# Patient Record
Sex: FEMALE | Race: WHITE | Employment: OTHER | URBAN - METROPOLITAN AREA
[De-identification: names, ages, dates, MRNs, and addresses within clinical notes are randomized per-mention and may not be internally consistent; named-entity substitution may affect disease eponyms.]

---

## 2022-07-30 ENCOUNTER — APPOINTMENT (OUTPATIENT)
Dept: GENERAL RADIOLOGY | Facility: HOSPITAL | Age: 87
End: 2022-07-30
Attending: EMERGENCY MEDICINE
Payer: MEDICARE

## 2022-07-30 ENCOUNTER — APPOINTMENT (OUTPATIENT)
Dept: CT IMAGING | Facility: HOSPITAL | Age: 87
DRG: 071 | End: 2022-07-30
Attending: EMERGENCY MEDICINE
Payer: MEDICARE

## 2022-07-30 ENCOUNTER — APPOINTMENT (OUTPATIENT)
Dept: GENERAL RADIOLOGY | Facility: HOSPITAL | Age: 87
DRG: 071 | End: 2022-07-30
Attending: EMERGENCY MEDICINE
Payer: MEDICARE

## 2022-07-30 ENCOUNTER — HOSPITAL ENCOUNTER (INPATIENT)
Facility: HOSPITAL | Age: 87
LOS: 3 days | Discharge: NURSING, REHAB OR LONG-TERM CARE | End: 2022-08-07
Attending: EMERGENCY MEDICINE | Admitting: HOSPITALIST
Payer: MEDICARE

## 2022-07-30 ENCOUNTER — HOSPITAL ENCOUNTER (INPATIENT)
Facility: HOSPITAL | Age: 87
LOS: 3 days | Discharge: NURSING, REHAB OR LONG-TERM CARE | DRG: 071 | End: 2022-08-07
Attending: EMERGENCY MEDICINE | Admitting: HOSPITALIST
Payer: MEDICARE

## 2022-07-30 ENCOUNTER — APPOINTMENT (OUTPATIENT)
Dept: CT IMAGING | Facility: HOSPITAL | Age: 87
End: 2022-07-30
Attending: EMERGENCY MEDICINE
Payer: MEDICARE

## 2022-07-30 DIAGNOSIS — K62.89 PROCTITIS: ICD-10-CM

## 2022-07-30 DIAGNOSIS — G93.40 ACUTE ENCEPHALOPATHY: Primary | ICD-10-CM

## 2022-07-30 DIAGNOSIS — K62.89 RECTAL PAIN: ICD-10-CM

## 2022-07-30 LAB
ALBUMIN SERPL-MCNC: 3.4 G/DL (ref 3.4–5)
ALBUMIN/GLOB SERPL: 1 {RATIO} (ref 1–2)
ALP LIVER SERPL-CCNC: 84 U/L
ALT SERPL-CCNC: 16 U/L
ANION GAP SERPL CALC-SCNC: 8 MMOL/L (ref 0–18)
AST SERPL-CCNC: 17 U/L (ref 15–37)
BASOPHILS # BLD AUTO: 0.04 X10(3) UL (ref 0–0.2)
BASOPHILS NFR BLD AUTO: 0.4 %
BILIRUB SERPL-MCNC: 0.4 MG/DL (ref 0.1–2)
BUN BLD-MCNC: 16 MG/DL (ref 7–18)
CALCIUM BLD-MCNC: 9.1 MG/DL (ref 8.5–10.1)
CHLORIDE SERPL-SCNC: 106 MMOL/L (ref 98–112)
CO2 SERPL-SCNC: 25 MMOL/L (ref 21–32)
CREAT BLD-MCNC: 1.01 MG/DL
EOSINOPHIL # BLD AUTO: 0.03 X10(3) UL (ref 0–0.7)
EOSINOPHIL NFR BLD AUTO: 0.3 %
ERYTHROCYTE [DISTWIDTH] IN BLOOD BY AUTOMATED COUNT: 13.6 %
GLOBULIN PLAS-MCNC: 3.5 G/DL (ref 2.8–4.4)
GLUCOSE BLD-MCNC: 306 MG/DL (ref 70–99)
HCT VFR BLD AUTO: 39.6 %
HGB BLD-MCNC: 12.6 G/DL
IMM GRANULOCYTES # BLD AUTO: 0.07 X10(3) UL (ref 0–1)
IMM GRANULOCYTES NFR BLD: 0.7 %
LYMPHOCYTES # BLD AUTO: 0.81 X10(3) UL (ref 1–4)
LYMPHOCYTES NFR BLD AUTO: 7.6 %
MCH RBC QN AUTO: 27.4 PG (ref 26–34)
MCHC RBC AUTO-ENTMCNC: 31.8 G/DL (ref 31–37)
MCV RBC AUTO: 86.1 FL
MONOCYTES # BLD AUTO: 0.64 X10(3) UL (ref 0.1–1)
MONOCYTES NFR BLD AUTO: 6 %
NEUTROPHILS # BLD AUTO: 9.08 X10 (3) UL (ref 1.5–7.7)
NEUTROPHILS # BLD AUTO: 9.08 X10(3) UL (ref 1.5–7.7)
NEUTROPHILS NFR BLD AUTO: 85 %
OSMOLALITY SERPL CALC.SUM OF ELEC: 301 MOSM/KG (ref 275–295)
PLATELET # BLD AUTO: 203 10(3)UL (ref 150–450)
POTASSIUM SERPL-SCNC: 4.6 MMOL/L (ref 3.5–5.1)
PROT SERPL-MCNC: 6.9 G/DL (ref 6.4–8.2)
RBC # BLD AUTO: 4.6 X10(6)UL
SARS-COV-2 RNA RESP QL NAA+PROBE: NOT DETECTED
SODIUM SERPL-SCNC: 139 MMOL/L (ref 136–145)
WBC # BLD AUTO: 10.7 X10(3) UL (ref 4–11)

## 2022-07-30 PROCEDURE — 84484 ASSAY OF TROPONIN QUANT: CPT | Performed by: EMERGENCY MEDICINE

## 2022-07-30 PROCEDURE — 36415 COLL VENOUS BLD VENIPUNCTURE: CPT

## 2022-07-30 PROCEDURE — 99285 EMERGENCY DEPT VISIT HI MDM: CPT

## 2022-07-30 PROCEDURE — 83036 HEMOGLOBIN GLYCOSYLATED A1C: CPT | Performed by: HOSPITALIST

## 2022-07-30 PROCEDURE — 93010 ELECTROCARDIOGRAM REPORT: CPT

## 2022-07-30 PROCEDURE — 81001 URINALYSIS AUTO W/SCOPE: CPT | Performed by: EMERGENCY MEDICINE

## 2022-07-30 PROCEDURE — 80053 COMPREHEN METABOLIC PANEL: CPT | Performed by: EMERGENCY MEDICINE

## 2022-07-30 PROCEDURE — 71045 X-RAY EXAM CHEST 1 VIEW: CPT | Performed by: EMERGENCY MEDICINE

## 2022-07-30 PROCEDURE — 85025 COMPLETE CBC W/AUTO DIFF WBC: CPT | Performed by: EMERGENCY MEDICINE

## 2022-07-30 RX ORDER — MAGNESIUM OXIDE 400 MG/1
400 TABLET ORAL DAILY
COMMUNITY

## 2022-07-30 RX ORDER — INSULIN GLARGINE 100 [IU]/ML
20 INJECTION, SOLUTION SUBCUTANEOUS NIGHTLY
COMMUNITY

## 2022-07-30 RX ORDER — ENALAPRIL MALEATE 20 MG/1
20 TABLET ORAL DAILY
COMMUNITY

## 2022-07-30 RX ORDER — ATORVASTATIN CALCIUM 10 MG/1
10 TABLET, FILM COATED ORAL NIGHTLY
COMMUNITY

## 2022-07-30 RX ORDER — ESCITALOPRAM OXALATE 10 MG/1
10 TABLET ORAL DAILY
COMMUNITY

## 2022-07-30 RX ORDER — MEMANTINE HYDROCHLORIDE 10 MG/1
10 TABLET ORAL 2 TIMES DAILY
COMMUNITY

## 2022-07-30 RX ORDER — ASPIRIN 81 MG/1
81 TABLET ORAL DAILY
COMMUNITY

## 2022-07-31 ENCOUNTER — APPOINTMENT (OUTPATIENT)
Dept: CT IMAGING | Facility: HOSPITAL | Age: 87
End: 2022-07-31
Attending: HOSPITALIST
Payer: MEDICARE

## 2022-07-31 ENCOUNTER — APPOINTMENT (OUTPATIENT)
Dept: CT IMAGING | Facility: HOSPITAL | Age: 87
DRG: 071 | End: 2022-07-31
Attending: EMERGENCY MEDICINE
Payer: MEDICARE

## 2022-07-31 ENCOUNTER — APPOINTMENT (OUTPATIENT)
Dept: CT IMAGING | Facility: HOSPITAL | Age: 87
End: 2022-07-31
Attending: EMERGENCY MEDICINE
Payer: MEDICARE

## 2022-07-31 ENCOUNTER — APPOINTMENT (OUTPATIENT)
Dept: CT IMAGING | Facility: HOSPITAL | Age: 87
DRG: 071 | End: 2022-07-31
Attending: HOSPITALIST
Payer: MEDICARE

## 2022-07-31 PROBLEM — E78.00 PURE HYPERCHOLESTEROLEMIA: Chronic | Status: ACTIVE | Noted: 2022-01-01

## 2022-07-31 PROBLEM — G93.40 ACUTE ENCEPHALOPATHY: Status: ACTIVE | Noted: 2022-07-31

## 2022-07-31 PROBLEM — E11.9 TYPE 2 DIABETES MELLITUS WITHOUT COMPLICATION, WITHOUT LONG-TERM CURRENT USE OF INSULIN (HCC): Chronic | Status: ACTIVE | Noted: 2022-07-31

## 2022-07-31 PROBLEM — F02.80 LATE ONSET ALZHEIMER'S DEMENTIA WITHOUT BEHAVIORAL DISTURBANCE (HCC): Chronic | Status: ACTIVE | Noted: 2022-07-31

## 2022-07-31 PROBLEM — G93.40 ACUTE ENCEPHALOPATHY: Status: ACTIVE | Noted: 2022-01-01

## 2022-07-31 PROBLEM — I10 PRIMARY HYPERTENSION: Status: ACTIVE | Noted: 2022-01-01

## 2022-07-31 PROBLEM — G30.1 LATE ONSET ALZHEIMER'S DEMENTIA WITHOUT BEHAVIORAL DISTURBANCE (HCC): Chronic | Status: ACTIVE | Noted: 2022-07-31

## 2022-07-31 PROBLEM — G30.1 LATE ONSET ALZHEIMER'S DEMENTIA WITHOUT BEHAVIORAL DISTURBANCE (HCC): Chronic | Status: ACTIVE | Noted: 2022-01-01

## 2022-07-31 PROBLEM — R73.9 HYPERGLYCEMIA: Status: ACTIVE | Noted: 2022-07-31

## 2022-07-31 PROBLEM — E78.00 PURE HYPERCHOLESTEROLEMIA: Chronic | Status: ACTIVE | Noted: 2022-07-31

## 2022-07-31 PROBLEM — R73.9 HYPERGLYCEMIA: Status: ACTIVE | Noted: 2022-01-01

## 2022-07-31 PROBLEM — F02.80 LATE ONSET ALZHEIMER'S DEMENTIA WITHOUT BEHAVIORAL DISTURBANCE (HCC): Chronic | Status: ACTIVE | Noted: 2022-01-01

## 2022-07-31 PROBLEM — R41.0 DELIRIUM: Status: ACTIVE | Noted: 2022-07-31

## 2022-07-31 PROBLEM — E11.9 TYPE 2 DIABETES MELLITUS WITHOUT COMPLICATION, WITHOUT LONG-TERM CURRENT USE OF INSULIN (HCC): Chronic | Status: ACTIVE | Noted: 2022-01-01

## 2022-07-31 PROBLEM — I10 PRIMARY HYPERTENSION: Status: ACTIVE | Noted: 2022-07-31

## 2022-07-31 PROBLEM — R41.0 DELIRIUM: Status: ACTIVE | Noted: 2022-01-01

## 2022-07-31 LAB
ATRIAL RATE: 73 BPM
BILIRUB UR QL STRIP.AUTO: NEGATIVE
CLARITY UR REFRACT.AUTO: CLEAR
COLOR UR AUTO: YELLOW
EST. AVERAGE GLUCOSE BLD GHB EST-MCNC: 217 MG/DL (ref 68–126)
GLUCOSE BLD-MCNC: 159 MG/DL (ref 70–99)
GLUCOSE BLD-MCNC: 191 MG/DL (ref 70–99)
GLUCOSE BLD-MCNC: 201 MG/DL (ref 70–99)
GLUCOSE BLD-MCNC: 214 MG/DL (ref 70–99)
GLUCOSE BLD-MCNC: 286 MG/DL (ref 70–99)
GLUCOSE BLD-MCNC: 321 MG/DL (ref 70–99)
GLUCOSE UR STRIP.AUTO-MCNC: 500 MG/DL
HBA1C MFR BLD: 9.2 % (ref ?–5.7)
HYALINE CASTS #/AREA URNS AUTO: PRESENT /LPF
LEUKOCYTE ESTERASE UR QL STRIP.AUTO: NEGATIVE
NITRITE UR QL STRIP.AUTO: NEGATIVE
P AXIS: 80 DEGREES
P-R INTERVAL: 232 MS
PH UR STRIP.AUTO: 6 [PH] (ref 5–8)
Q-T INTERVAL: 428 MS
QRS DURATION: 112 MS
QTC CALCULATION (BEZET): 471 MS
R AXIS: 39 DEGREES
RBC UR QL AUTO: NEGATIVE
SP GR UR STRIP.AUTO: 1.02 (ref 1–1.03)
T AXIS: 124 DEGREES
TROPONIN I HIGH SENSITIVITY: 11 NG/L
UROBILINOGEN UR STRIP.AUTO-MCNC: 0.2 MG/DL
VENTRICULAR RATE: 73 BPM

## 2022-07-31 PROCEDURE — 82962 GLUCOSE BLOOD TEST: CPT

## 2022-07-31 PROCEDURE — 74177 CT ABD & PELVIS W/CONTRAST: CPT | Performed by: HOSPITALIST

## 2022-07-31 PROCEDURE — 70450 CT HEAD/BRAIN W/O DYE: CPT | Performed by: EMERGENCY MEDICINE

## 2022-07-31 PROCEDURE — 93005 ELECTROCARDIOGRAM TRACING: CPT

## 2022-07-31 RX ORDER — ATORVASTATIN CALCIUM 10 MG/1
10 TABLET, FILM COATED ORAL NIGHTLY
Status: DISCONTINUED | OUTPATIENT
Start: 2022-07-31 | End: 2022-08-07

## 2022-07-31 RX ORDER — NICOTINE POLACRILEX 4 MG
15 LOZENGE BUCCAL
Status: DISCONTINUED | OUTPATIENT
Start: 2022-07-31 | End: 2022-08-07

## 2022-07-31 RX ORDER — SENNOSIDES 8.6 MG
17.2 TABLET ORAL NIGHTLY PRN
Status: DISCONTINUED | OUTPATIENT
Start: 2022-07-31 | End: 2022-08-07

## 2022-07-31 RX ORDER — SODIUM PHOSPHATE, DIBASIC AND SODIUM PHOSPHATE, MONOBASIC 7; 19 G/133ML; G/133ML
1 ENEMA RECTAL ONCE AS NEEDED
Status: COMPLETED | OUTPATIENT
Start: 2022-07-31 | End: 2022-08-01

## 2022-07-31 RX ORDER — ONDANSETRON 2 MG/ML
4 INJECTION INTRAMUSCULAR; INTRAVENOUS EVERY 6 HOURS PRN
Status: DISCONTINUED | OUTPATIENT
Start: 2022-07-31 | End: 2022-08-07

## 2022-07-31 RX ORDER — BISACODYL 10 MG
10 SUPPOSITORY, RECTAL RECTAL
Status: DISCONTINUED | OUTPATIENT
Start: 2022-07-31 | End: 2022-08-07

## 2022-07-31 RX ORDER — ONDANSETRON 2 MG/ML
4 INJECTION INTRAMUSCULAR; INTRAVENOUS EVERY 4 HOURS PRN
Status: DISCONTINUED | OUTPATIENT
Start: 2022-07-31 | End: 2022-07-31

## 2022-07-31 RX ORDER — HYDROMORPHONE HYDROCHLORIDE 1 MG/ML
0.5 INJECTION, SOLUTION INTRAMUSCULAR; INTRAVENOUS; SUBCUTANEOUS EVERY 30 MIN PRN
Status: ACTIVE | OUTPATIENT
Start: 2022-07-31 | End: 2022-07-31

## 2022-07-31 RX ORDER — IOHEXOL 350 MG/ML
80 INJECTION, SOLUTION INTRAVENOUS
Status: COMPLETED | OUTPATIENT
Start: 2022-07-31 | End: 2022-07-31

## 2022-07-31 RX ORDER — ACETAMINOPHEN 500 MG
500 TABLET ORAL EVERY 4 HOURS PRN
Status: DISCONTINUED | OUTPATIENT
Start: 2022-07-31 | End: 2022-08-07

## 2022-07-31 RX ORDER — DEXTROSE MONOHYDRATE 25 G/50ML
50 INJECTION, SOLUTION INTRAVENOUS
Status: DISCONTINUED | OUTPATIENT
Start: 2022-07-31 | End: 2022-08-07

## 2022-07-31 RX ORDER — HEPARIN SODIUM 5000 [USP'U]/ML
5000 INJECTION, SOLUTION INTRAVENOUS; SUBCUTANEOUS EVERY 8 HOURS SCHEDULED
Status: DISCONTINUED | OUTPATIENT
Start: 2022-07-31 | End: 2022-08-07

## 2022-07-31 RX ORDER — MEMANTINE HYDROCHLORIDE 10 MG/1
10 TABLET ORAL 2 TIMES DAILY
Status: DISCONTINUED | OUTPATIENT
Start: 2022-07-31 | End: 2022-08-07

## 2022-07-31 RX ORDER — ESCITALOPRAM OXALATE 10 MG/1
10 TABLET ORAL DAILY
Status: DISCONTINUED | OUTPATIENT
Start: 2022-07-31 | End: 2022-08-07

## 2022-07-31 RX ORDER — MELATONIN
3 NIGHTLY PRN
Status: DISCONTINUED | OUTPATIENT
Start: 2022-07-31 | End: 2022-08-07

## 2022-07-31 RX ORDER — ENALAPRIL MALEATE 10 MG/1
20 TABLET ORAL DAILY
Status: DISCONTINUED | OUTPATIENT
Start: 2022-07-31 | End: 2022-08-07

## 2022-07-31 RX ORDER — METOCLOPRAMIDE HYDROCHLORIDE 5 MG/ML
5 INJECTION INTRAMUSCULAR; INTRAVENOUS EVERY 8 HOURS PRN
Status: DISCONTINUED | OUTPATIENT
Start: 2022-07-31 | End: 2022-08-07

## 2022-07-31 RX ORDER — POLYETHYLENE GLYCOL 3350 17 G/17G
17 POWDER, FOR SOLUTION ORAL DAILY PRN
Status: DISCONTINUED | OUTPATIENT
Start: 2022-07-31 | End: 2022-08-02

## 2022-07-31 RX ORDER — SODIUM CHLORIDE 9 MG/ML
INJECTION, SOLUTION INTRAVENOUS CONTINUOUS
Status: ACTIVE | OUTPATIENT
Start: 2022-07-31 | End: 2022-07-31

## 2022-07-31 RX ORDER — NICOTINE POLACRILEX 4 MG
30 LOZENGE BUCCAL
Status: DISCONTINUED | OUTPATIENT
Start: 2022-07-31 | End: 2022-08-07

## 2022-07-31 NOTE — ED QUICK NOTES
Orders for admission, patient is aware of plan and ready to go upstairs. Any questions, please call ED RN Darcy Coleman  at extension [phone]. Vaccinated?  Unknown  Type of COVID test sent: Rapid PCR  COVID Suspicion level: Low      Titratable drug(s) infusing:N/A  Rate:    LOC at time of transport:AX[facility]    Other pertinent information:    CIWA score=N/A  NIH score=N/A

## 2022-07-31 NOTE — CM/SW NOTE
PT recs A[facility]. [facility] referrals in [address] [place] [address]. Await accepting facilities and patient/family choice. Medicare A & B, no insurance auth needed.      Ashish Johnston LCSW  /Discharge Planner

## 2022-07-31 NOTE — ED INITIAL ASSESSMENT (HPI)
Pt arrived via ambulance after family reported pt has been weak, lethargic, having poor appetite, and complained of rectal pain. Pt had a bowel movement on herself on route to the hospital. Pt has been having odd behavior at home and confusion.

## 2022-07-31 NOTE — PROGRESS NOTES
NURSING ADMISSION NOTE      Patient admitted via Cart  Oriented to room. Safety precautions initiated. Bed in low position. Call light in reach. Admitted to room 509    Pt arrived alert and oriented x2, confused and lethargic. Was on RA, placed on 1L NC for sleep. IVF started. Purewick in place. Will call family in morning for admission navigator an med rec. Pt resting in bed, WCTM and update on POC. Bed alarm on.    0630: Attempted twice to call the son ([phone]) is morning for admission navigator and med rec. No answer, voicemail box is full. Will endorse to day RN.

## 2022-08-01 ENCOUNTER — ANESTHESIA EVENT (OUTPATIENT)
Dept: ENDOSCOPY | Facility: HOSPITAL | Age: 87
End: 2022-08-01
Payer: MEDICARE

## 2022-08-01 PROBLEM — Z71.89 COUNSELING REGARDING ADVANCE CARE PLANNING AND GOALS OF CARE: Status: ACTIVE | Noted: 2022-08-01

## 2022-08-01 PROBLEM — Z71.89 COUNSELING REGARDING ADVANCE CARE PLANNING AND GOALS OF CARE: Status: ACTIVE | Noted: 2022-01-01

## 2022-08-01 PROBLEM — Z51.5 PALLIATIVE CARE ENCOUNTER: Status: ACTIVE | Noted: 2022-01-01

## 2022-08-01 PROBLEM — Z51.5 PALLIATIVE CARE ENCOUNTER: Status: ACTIVE | Noted: 2022-08-01

## 2022-08-01 LAB
ANION GAP SERPL CALC-SCNC: 10 MMOL/L (ref 0–18)
BASOPHILS # BLD AUTO: 0.03 X10(3) UL (ref 0–0.2)
BASOPHILS NFR BLD AUTO: 0.2 %
BUN BLD-MCNC: 13 MG/DL (ref 7–18)
CALCIUM BLD-MCNC: 9.1 MG/DL (ref 8.5–10.1)
CHLORIDE SERPL-SCNC: 104 MMOL/L (ref 98–112)
CO2 SERPL-SCNC: 23 MMOL/L (ref 21–32)
CREAT BLD-MCNC: 0.89 MG/DL
EOSINOPHIL # BLD AUTO: 0 X10(3) UL (ref 0–0.7)
EOSINOPHIL NFR BLD AUTO: 0 %
ERYTHROCYTE [DISTWIDTH] IN BLOOD BY AUTOMATED COUNT: 13.5 %
GLUCOSE BLD-MCNC: 294 MG/DL (ref 70–99)
GLUCOSE BLD-MCNC: 296 MG/DL (ref 70–99)
GLUCOSE BLD-MCNC: 310 MG/DL (ref 70–99)
GLUCOSE BLD-MCNC: 319 MG/DL (ref 70–99)
GLUCOSE BLD-MCNC: 365 MG/DL (ref 70–99)
HCT VFR BLD AUTO: 44 %
HGB BLD-MCNC: 13.7 G/DL
IMM GRANULOCYTES # BLD AUTO: 0.04 X10(3) UL (ref 0–1)
IMM GRANULOCYTES NFR BLD: 0.3 %
LYMPHOCYTES # BLD AUTO: 0.58 X10(3) UL (ref 1–4)
LYMPHOCYTES NFR BLD AUTO: 4.7 %
MCH RBC QN AUTO: 27.1 PG (ref 26–34)
MCHC RBC AUTO-ENTMCNC: 31.1 G/DL (ref 31–37)
MCV RBC AUTO: 87.1 FL
MONOCYTES # BLD AUTO: 1.32 X10(3) UL (ref 0.1–1)
MONOCYTES NFR BLD AUTO: 10.6 %
NEUTROPHILS # BLD AUTO: 10.44 X10 (3) UL (ref 1.5–7.7)
NEUTROPHILS # BLD AUTO: 10.44 X10(3) UL (ref 1.5–7.7)
NEUTROPHILS NFR BLD AUTO: 84.2 %
OSMOLALITY SERPL CALC.SUM OF ELEC: 296 MOSM/KG (ref 275–295)
PLATELET # BLD AUTO: 227 10(3)UL (ref 150–450)
POTASSIUM SERPL-SCNC: 3.7 MMOL/L (ref 3.5–5.1)
RBC # BLD AUTO: 5.05 X10(6)UL
SODIUM SERPL-SCNC: 137 MMOL/L (ref 136–145)
WBC # BLD AUTO: 12.4 X10(3) UL (ref 4–11)

## 2022-08-01 PROCEDURE — 97166 OT EVAL MOD COMPLEX 45 MIN: CPT

## 2022-08-01 PROCEDURE — 87507 IADNA-DNA/RNA PROBE TQ 12-25: CPT | Performed by: NURSE PRACTITIONER

## 2022-08-01 PROCEDURE — 87493 C DIFF AMPLIFIED PROBE: CPT | Performed by: NURSE PRACTITIONER

## 2022-08-01 PROCEDURE — 85025 COMPLETE CBC W/AUTO DIFF WBC: CPT | Performed by: HOSPITALIST

## 2022-08-01 PROCEDURE — 4350F CNSLNG PROVIDED SYMP MNGMNT: CPT | Performed by: NURSE PRACTITIONER

## 2022-08-01 PROCEDURE — 80048 BASIC METABOLIC PNL TOTAL CA: CPT | Performed by: HOSPITALIST

## 2022-08-01 PROCEDURE — 82962 GLUCOSE BLOOD TEST: CPT

## 2022-08-01 PROCEDURE — 97535 SELF CARE MNGMENT TRAINING: CPT

## 2022-08-01 RX ORDER — SODIUM PHOSPHATE, DIBASIC AND SODIUM PHOSPHATE, MONOBASIC 3.5; 9.5 G/66ML; G/66ML
1 ENEMA RECTAL
Status: COMPLETED | OUTPATIENT
Start: 2022-08-02 | End: 2022-08-02

## 2022-08-01 RX ORDER — LORAZEPAM 2 MG/ML
1 CONCENTRATE ORAL EVERY 8 HOURS PRN
Status: CANCELLED | OUTPATIENT
Start: 2022-08-01

## 2022-08-01 RX ORDER — LORAZEPAM 2 MG/ML
1 INJECTION INTRAMUSCULAR ONCE
Status: DISCONTINUED | OUTPATIENT
Start: 2022-08-01 | End: 2022-08-01

## 2022-08-01 RX ORDER — SODIUM PHOSPHATE, DIBASIC AND SODIUM PHOSPHATE, MONOBASIC 3.5; 9.5 G/66ML; G/66ML
1 ENEMA RECTAL ONCE
Status: COMPLETED | OUTPATIENT
Start: 2022-08-01 | End: 2022-08-01

## 2022-08-01 NOTE — CM/SW NOTE
RAÚL spoke to patient and family for follow up discharge planning; A[facility] choice list left for patient/family review. Plan for flexible sigmoidoscopy tomorrow per progress note review. CM/SW to follow up for choice to finalize discharge plan.     Mary Garzon, RN Case Manager [id number]

## 2022-08-01 NOTE — PLAN OF CARE
Patient is alert and oriented to self. Hx dementia. On 2L nc while sleeping. NS on tele. VSS. On heparin. Incontinent of b/b. States rectal pain is minimal. QID accuchecks. 1:1 feed assist. PT. Fall precautions in place. POC discussed with family at bedside, all questions answered.    Problem: Diabetes/Glucose Control  Goal: Glucose maintained within prescribed range  Description: INTERVENTIONS:  - Monitor Blood Glucose as ordered  - Assess for signs and symptoms of hyperglycemia and hypoglycemia  - Administer ordered medications to maintain glucose within target range  - Assess barriers to adequate nutritional intake and initiate nutrition consult as needed  - Instruct patient on self management of diabetes  Outcome: Progressing     Problem: Patient/Family Goals  Goal: Patient/Family Long Term Goal  Description: Patient's Long Term Goal: discharge home with appropriate means    Interventions:  - imaging  - PT  - meds per mar  - See additional Care Plan goals for specific interventions  Outcome: Progressing  Goal: Patient/Family Short Term Goal  Description: Patient's Short Term Goal:   7/31 noc: manage pain    Interventions:   - pain meds prn  - See additional Care Plan goals for specific interventions  Outcome: Progressing     Problem: Delirium  Goal: Minimize duration of delirium  Description: Interventions:  - Encourage use of hearing aids, eye glasses  - Promote highest level of mobility daily  - Provide frequent reorientation  - Promote wakefulness i.e. lights on, blinds open  - Promote sleep, encourage patient's normal rest cycle i.e. lights off, TV off, minimize noise and interruptions  - Encourage family to assist in orientation and promotion of home routines  Outcome: Progressing     Problem: PAIN - ADULT  Goal: Verbalizes/displays adequate comfort level or patient's stated pain goal  Description: INTERVENTIONS:  - Encourage pt to monitor pain and request assistance  - Assess pain using appropriate pain scale  - Administer analgesics based on type and severity of pain and evaluate response  - Implement non-pharmacological measures as appropriate and evaluate response  - Consider cultural and social influences on pain and pain management  - Manage/alleviate anxiety  - Utilize distraction and/or relaxation techniques  - Monitor for opioid side effects  - Notify MD/LIP if interventions unsuccessful or patient reports new pain  - Anticipate increased pain with activity and pre-medicate as appropriate  Outcome: Progressing     Problem: Discharge Barriers  Goal: Patient's discharge needs are met  Description: Collaborate with interdisciplinary team and initiate plans and interventions as needed.    Outcome: Progressing     Problem: MUSCULOSKELETAL - ADULT  Goal: Return mobility to safest level of function  Description: INTERVENTIONS:  - Assess patient stability and activity tolerance for standing, transferring and ambulating w/ or w/o assistive devices  - Assist with transfers and ambulation using safe patient handling equipment as needed  - Ensure adequate protection for wounds/incisions during mobilization  - Obtain PT/OT consults as needed  - Advance activity as appropriate  - Communicate ordered activity level and limitations with patient/family  Outcome: Not Progressing

## 2022-08-01 NOTE — PLAN OF CARE
Patient is AO x 1-2. Alert, able to feed self safely today. Fair appetite. Denies pain/discomfort. No N/V/D. Awaiting stool sample for GI panel. GI & Palliative consult added today. Plan for flex sig tomorrow, consents obtained & placed on chart. Full liquid diet until NPO at midnight. Fleets enema x 2 ordered for bowel prep. Son Magan Olvera at bedside, updated on POC. Patient rounded on routinely.      Problem: Diabetes/Glucose Control  Goal: Glucose maintained within prescribed range  Description: INTERVENTIONS:  - Monitor Blood Glucose as ordered  - Assess for signs and symptoms of hyperglycemia and hypoglycemia  - Administer ordered medications to maintain glucose within target range  - Assess barriers to adequate nutritional intake and initiate nutrition consult as needed  - Instruct patient on self management of diabetes  Outcome: Progressing     Problem: Patient/Family Goals  Goal: Patient/Family Long Term Goal  Description: Patient's Long Term Goal:     Interventions:  - See additional Care Plan goals for specific interventions  Outcome: Progressing  Goal: Patient/Family Short Term Goal  Description: Patient's Short Term Goal:   7/31 noc: manage pain    Interventions:   - pain meds prn  - See additional Care Plan goals for specific interventions  Outcome: Progressing     Problem: Delirium  Goal: Minimize duration of delirium  Description: Interventions:  - Encourage use of hearing aids, eye glasses  - Promote highest level of mobility daily  - Provide frequent reorientation  - Promote wakefulness i.e. lights on, blinds open  - Promote sleep, encourage patient's normal rest cycle i.e. lights off, TV off, minimize noise and interruptions  - Encourage family to assist in orientation and promotion of home routines  Outcome: Progressing     Problem: MUSCULOSKELETAL - ADULT  Goal: Return mobility to safest level of function  Description: INTERVENTIONS:  - Assess patient stability and activity tolerance for standing, transferring and ambulating w/ or w/o assistive devices  - Assist with transfers and ambulation using safe patient handling equipment as needed  - Ensure adequate protection for wounds/incisions during mobilization  - Obtain PT/OT consults as needed  - Advance activity as appropriate  - Communicate ordered activity level and limitations with patient/family  Outcome: Progressing     Problem: PAIN - ADULT  Goal: Verbalizes/displays adequate comfort level or patient's stated pain goal  Description: INTERVENTIONS:  - Encourage pt to monitor pain and request assistance  - Assess pain using appropriate pain scale  - Administer analgesics based on type and severity of pain and evaluate response  - Implement non-pharmacological measures as appropriate and evaluate response  - Consider cultural and social influences on pain and pain management  - Manage/alleviate anxiety  - Utilize distraction and/or relaxation techniques  - Monitor for opioid side effects  - Notify MD/LIP if interventions unsuccessful or patient reports new pain  - Anticipate increased pain with activity and pre-medicate as appropriate  Outcome: Progressing     Problem: Discharge Barriers  Goal: Patient's discharge needs are met  Description: Collaborate with interdisciplinary team and initiate plans and interventions as needed.    Outcome: Progressing

## 2022-08-01 NOTE — DIETARY NOTE
[address] [facility]    Pt chart reviewed for A1C of 9.2%. Per chart review, pt is 80year old female with hx of dementia and only AAOx1. Therefore, pt not appropriate for diet education at this time. Will follow up as appropriate for full nutrition assessment. Please consult if patient status changes or nutrition issues arise.     Marielena Thayer RD, LDN, [address] [place]   Clinical Dietitian  Spectra: [phone]  Pager: [phone]

## 2022-08-02 ENCOUNTER — ANESTHESIA (OUTPATIENT)
Dept: ENDOSCOPY | Facility: HOSPITAL | Age: 87
End: 2022-08-02
Payer: MEDICARE

## 2022-08-02 LAB
ADENOVIRUS F 40/41 PCR: NEGATIVE
ANION GAP SERPL CALC-SCNC: 7 MMOL/L (ref 0–18)
ASTROVIRUS PCR: NEGATIVE
BASOPHILS # BLD AUTO: 0.05 X10(3) UL (ref 0–0.2)
BASOPHILS NFR BLD AUTO: 0.5 %
BUN BLD-MCNC: 31 MG/DL (ref 7–18)
C CAYETANENSIS DNA SPEC QL NAA+PROBE: NEGATIVE
CALCIUM BLD-MCNC: 9.2 MG/DL (ref 8.5–10.1)
CAMPY SP DNA.DIARRHEA STL QL NAA+PROBE: NEGATIVE
CHLORIDE SERPL-SCNC: 107 MMOL/L (ref 98–112)
CO2 SERPL-SCNC: 23 MMOL/L (ref 21–32)
CREAT BLD-MCNC: 1.06 MG/DL
CRYPTOSP DNA SPEC QL NAA+PROBE: NEGATIVE
EAEC PAA PLAS AGGR+AATA ST NAA+NON-PRB: NEGATIVE
EC STX1+STX2 + H7 FLIC SPEC NAA+PROBE: NEGATIVE
ENTAMOEBA HISTOLYTICA PCR: NEGATIVE
EOSINOPHIL # BLD AUTO: 0.01 X10(3) UL (ref 0–0.7)
EOSINOPHIL NFR BLD AUTO: 0.1 %
EPEC EAE GENE STL QL NAA+NON-PROBE: NEGATIVE
ERYTHROCYTE [DISTWIDTH] IN BLOOD BY AUTOMATED COUNT: 13.9 %
ETEC LTA+ST1A+ST1B TOX ST NAA+NON-PROBE: NEGATIVE
GIARDIA LAMBLIA PCR: NEGATIVE
GLUCOSE BLD-MCNC: 106 MG/DL (ref 70–99)
GLUCOSE BLD-MCNC: 107 MG/DL (ref 70–99)
GLUCOSE BLD-MCNC: 114 MG/DL (ref 70–99)
GLUCOSE BLD-MCNC: 114 MG/DL (ref 70–99)
GLUCOSE BLD-MCNC: 92 MG/DL (ref 70–99)
HCT VFR BLD AUTO: 42.9 %
HGB BLD-MCNC: 13.4 G/DL
IMM GRANULOCYTES # BLD AUTO: 0.03 X10(3) UL (ref 0–1)
IMM GRANULOCYTES NFR BLD: 0.3 %
LYMPHOCYTES # BLD AUTO: 1.03 X10(3) UL (ref 1–4)
LYMPHOCYTES NFR BLD AUTO: 9.5 %
MAGNESIUM SERPL-MCNC: 1.5 MG/DL (ref 1.6–2.6)
MCH RBC QN AUTO: 27.3 PG (ref 26–34)
MCHC RBC AUTO-ENTMCNC: 31.2 G/DL (ref 31–37)
MCV RBC AUTO: 87.6 FL
MONOCYTES # BLD AUTO: 1.39 X10(3) UL (ref 0.1–1)
MONOCYTES NFR BLD AUTO: 12.9 %
NEUTROPHILS # BLD AUTO: 8.29 X10 (3) UL (ref 1.5–7.7)
NEUTROPHILS # BLD AUTO: 8.29 X10(3) UL (ref 1.5–7.7)
NEUTROPHILS NFR BLD AUTO: 76.7 %
NOROVIRUS GI/GII PCR: NEGATIVE
OSMOLALITY SERPL CALC.SUM OF ELEC: 291 MOSM/KG (ref 275–295)
P SHIGELLOIDES DNA STL QL NAA+PROBE: NEGATIVE
PLATELET # BLD AUTO: 165 10(3)UL (ref 150–450)
POTASSIUM SERPL-SCNC: 3.8 MMOL/L (ref 3.5–5.1)
RBC # BLD AUTO: 4.9 X10(6)UL
ROTAVIRUS A PCR: NEGATIVE
SALMONELLA DNA SPEC QL NAA+PROBE: NEGATIVE
SAPOVIRUS PCR: NEGATIVE
SHIGELLA SP+EIEC IPAH ST NAA+NON-PROBE: NEGATIVE
SODIUM SERPL-SCNC: 137 MMOL/L (ref 136–145)
V CHOLERAE DNA SPEC QL NAA+PROBE: NEGATIVE
VIBRIO DNA SPEC NAA+PROBE: NEGATIVE
WBC # BLD AUTO: 10.8 X10(3) UL (ref 4–11)
YERSINIA DNA SPEC NAA+PROBE: NEGATIVE

## 2022-08-02 PROCEDURE — 82962 GLUCOSE BLOOD TEST: CPT

## 2022-08-02 PROCEDURE — 0DJD8ZZ INSPECTION OF LOWER INTESTINAL TRACT, VIA NATURAL OR ARTIFICIAL OPENING ENDOSCOPIC: ICD-10-PCS | Performed by: STUDENT IN AN ORGANIZED HEALTH CARE EDUCATION/TRAINING PROGRAM

## 2022-08-02 PROCEDURE — 80048 BASIC METABOLIC PNL TOTAL CA: CPT | Performed by: NURSE PRACTITIONER

## 2022-08-02 PROCEDURE — 83735 ASSAY OF MAGNESIUM: CPT | Performed by: NURSE PRACTITIONER

## 2022-08-02 PROCEDURE — 85025 COMPLETE CBC W/AUTO DIFF WBC: CPT | Performed by: NURSE PRACTITIONER

## 2022-08-02 RX ORDER — NALOXONE HYDROCHLORIDE 0.4 MG/ML
80 INJECTION, SOLUTION INTRAMUSCULAR; INTRAVENOUS; SUBCUTANEOUS AS NEEDED
Status: DISCONTINUED | OUTPATIENT
Start: 2022-08-02 | End: 2022-08-02 | Stop reason: HOSPADM

## 2022-08-02 RX ORDER — POLYETHYLENE GLYCOL 3350 17 G/17G
17 POWDER, FOR SOLUTION ORAL 2 TIMES DAILY
Status: DISCONTINUED | OUTPATIENT
Start: 2022-08-02 | End: 2022-08-05

## 2022-08-02 RX ORDER — ONDANSETRON 2 MG/ML
4 INJECTION INTRAMUSCULAR; INTRAVENOUS AS NEEDED
Status: DISCONTINUED | OUTPATIENT
Start: 2022-08-02 | End: 2022-08-02 | Stop reason: HOSPADM

## 2022-08-02 RX ORDER — HYDROCORTISONE ACETATE 25 MG/1
25 SUPPOSITORY RECTAL 2 TIMES DAILY
Status: DISCONTINUED | OUTPATIENT
Start: 2022-08-02 | End: 2022-08-07

## 2022-08-02 RX ORDER — SODIUM PHOSPHATE, DIBASIC AND SODIUM PHOSPHATE, MONOBASIC 7; 19 G/133ML; G/133ML
1 ENEMA RECTAL ONCE
Status: COMPLETED | OUTPATIENT
Start: 2022-08-02 | End: 2022-08-02

## 2022-08-02 RX ORDER — SODIUM CHLORIDE, SODIUM LACTATE, POTASSIUM CHLORIDE, CALCIUM CHLORIDE 600; 310; 30; 20 MG/100ML; MG/100ML; MG/100ML; MG/100ML
INJECTION, SOLUTION INTRAVENOUS CONTINUOUS
Status: DISCONTINUED | OUTPATIENT
Start: 2022-08-02 | End: 2022-08-03

## 2022-08-02 RX ORDER — LIDOCAINE HYDROCHLORIDE 10 MG/ML
INJECTION, SOLUTION EPIDURAL; INFILTRATION; INTRACAUDAL; PERINEURAL AS NEEDED
Status: DISCONTINUED | OUTPATIENT
Start: 2022-08-02 | End: 2022-08-02 | Stop reason: SURG

## 2022-08-02 RX ORDER — SODIUM CHLORIDE, SODIUM LACTATE, POTASSIUM CHLORIDE, CALCIUM CHLORIDE 600; 310; 30; 20 MG/100ML; MG/100ML; MG/100ML; MG/100ML
INJECTION, SOLUTION INTRAVENOUS CONTINUOUS PRN
Status: DISCONTINUED | OUTPATIENT
Start: 2022-08-02 | End: 2022-08-02 | Stop reason: SURG

## 2022-08-02 RX ADMIN — LIDOCAINE HYDROCHLORIDE 30 MG: 10 INJECTION, SOLUTION EPIDURAL; INFILTRATION; INTRACAUDAL; PERINEURAL at 16:57:00

## 2022-08-02 RX ADMIN — SODIUM CHLORIDE, SODIUM LACTATE, POTASSIUM CHLORIDE, CALCIUM CHLORIDE: 600; 310; 30; 20 INJECTION, SOLUTION INTRAVENOUS at 16:52:00

## 2022-08-02 NOTE — CM/SW NOTE
Called pt's son Ivana Hawkins about A[facility] choice - he said he left the list in the pt's room but is coming to the hospital this afternoon and will review the list and choose one for pt. SW to f/u with pt's son for AMAIRANI choice this afternoon.

## 2022-08-02 NOTE — ANESTHESIA POSTPROCEDURE EVALUATION
[address] [facility] Patient Status:  Observation   Age/Gender 80year old female MRN [id number]   Location [address] [facility] 28 Attending Anurag Garibay, [address] Day # 0 PCP [facility]       Anesthesia Post-op Note    FLEXIBLE SIGMOIDOSCOPY    Procedure Summary     Date: 08/02/22 Room / Location: [facility] ENDOSCOPY 04 / [facility] ENDOSCOPY    Anesthesia Start: 5464 Anesthesia Stop: 1014    Procedure: Karen Medicine (N/A ) Diagnosis:       Rectal pain      Proctitis      (Hemorrhoids, Constipation)    Surgeons: Cecilio Rosenberg MD Anesthesiologist: Janie Rangel MD    Anesthesia Type: MAC ASA Status: 3          Anesthesia Type: MAC    Vitals Value Taken Time   BP 95/62 08/02/22 1712   Temp  08/02/22 1712   Pulse 94 08/02/22 1712   Resp 16 08/02/22 1712   SpO2 99 08/02/22 1712       Patient Location: Endoscopy    Anesthesia Type: MAC    Airway Patency: patent    Postop Pain Control: adequate    Mental Status: mildly sedated but able to meaningfully participate in the post-anesthesia evaluation    Nausea/Vomiting: none    Cardiopulmonary/Hydration status: stable euvolemic    Complications: no apparent anesthesia related complications    Postop vital signs: stable    Dental Exam: Unchanged from Postop

## 2022-08-02 NOTE — CM/SW NOTE
Spoke with pt's son Jillian Julian who said his first choice for [facility] is [facility]. [facility] in [address]. SW following.

## 2022-08-02 NOTE — PLAN OF CARE
Pt AO to self. Confused/forgetful at times. Upper dentures. Maintaining O2 sats >90% on RA. NSR on tele. Heparin. Incont, Purewick/briefed, some rectal bleeding/ cream applied. Fleet enema for Flex sig procedure. No c/o N/V/D/C. QID accucheck. NPO at midnight. Pt updated on POC. Call light within reach, safety precautions in place. No further pt needs at this time.      Problem: Diabetes/Glucose Control  Goal: Glucose maintained within prescribed range  Description: INTERVENTIONS:  - Monitor Blood Glucose as ordered  - Assess for signs and symptoms of hyperglycemia and hypoglycemia  - Administer ordered medications to maintain glucose within target range  - Assess barriers to adequate nutritional intake and initiate nutrition consult as needed  - Instruct patient on self management of diabetes  Outcome: Progressing     Problem: Patient/Family Goals  Goal: Patient/Family Long Term Goal  Description: Patient's Long Term Goal:     Interventions:  -   - See additional Care Plan goals for specific interventions  Outcome: Progressing  Goal: Patient/Family Short Term Goal  Description: Patient's Short Term Goal:   7/31 noc: manage pain  8/1NOC: NPO, sleep  Interventions:   - pain meds prn  - See additional Care Plan goals for specific interventions  Outcome: Progressing     Problem: Delirium  Goal: Minimize duration of delirium  Description: Interventions:  - Encourage use of hearing aids, eye glasses  - Promote highest level of mobility daily  - Provide frequent reorientation  - Promote wakefulness i.e. lights on, blinds open  - Promote sleep, encourage patient's normal rest cycle i.e. lights off, TV off, minimize noise and interruptions  - Encourage family to assist in orientation and promotion of home routines  Outcome: Progressing     Problem: MUSCULOSKELETAL - ADULT  Goal: Return mobility to safest level of function  Description: INTERVENTIONS:  - Assess patient stability and activity tolerance for standing, transferring and ambulating w/ or w/o assistive devices  - Assist with transfers and ambulation using safe patient handling equipment as needed  - Ensure adequate protection for wounds/incisions during mobilization  - Obtain PT/OT consults as needed  - Advance activity as appropriate  - Communicate ordered activity level and limitations with patient/family  Outcome: Progressing     Problem: PAIN - ADULT  Goal: Verbalizes/displays adequate comfort level or patient's stated pain goal  Description: INTERVENTIONS:  - Encourage pt to monitor pain and request assistance  - Assess pain using appropriate pain scale  - Administer analgesics based on type and severity of pain and evaluate response  - Implement non-pharmacological measures as appropriate and evaluate response  - Consider cultural and social influences on pain and pain management  - Manage/alleviate anxiety  - Utilize distraction and/or relaxation techniques  - Monitor for opioid side effects  - Notify MD/LIP if interventions unsuccessful or patient reports new pain  - Anticipate increased pain with activity and pre-medicate as appropriate  Outcome: Progressing     Problem: Discharge Barriers  Goal: Patient's discharge needs are met  Description: Collaborate with interdisciplinary team and initiate plans and interventions as needed.    Outcome: Progressing

## 2022-08-02 NOTE — OPERATIVE REPORT
Flexible sigmoidoscopy operative report  Patient Name: [place]  Procedure: Sigmoidoscopy   Indication: rectal pain, proctitis, colitis  Attending: Tyshawn Greenwood M.D. Consent: The risks, benefits, and alternatives were discussed with the patient / POA. Risks included, but were not limited to, bleeding, perforation, medication effects, cardiac arrhythmias, missed polyps, and aspiration. After all questions were answered to their satisfaction, a signed, informed, and witnessed consent was obtained. Sedation: Monitored Anesthesia Care  Monitoring: Pulsoximetry, pulse, respirations, and blood pressure were monitored throughout the entire procedure    Preparation Quality: 2 Fleet's enemas  Procedure: After achieving adequate sedation, and placing the patient in the left lateral decubitus position, a digital rectal examination was performed. The lubricated tip of the pediatric colonoscope was then introduced into the rectum and advanced to the sigmoid colon. The scope was not advanced further due to copious amounts of solid stool. The endoscope was then carefully withdrawn from the patient with careful visualization of the colonic mucosa revealing no additional pathologic findings. Air was suctioned to the best of my ability, during withdrawal of the endoscope. When the endoscope reached the rectum, it was then removed from the patient. The patient tolerated the procedure without apparent procedural complications. The patient left the procedure room in stable condition for recovery. Findings:    1. External hemorrhoids present. Pain with digital rectal exam.  Unable to assess completely or any fissure or anal lesion due to copious amounts of liquid opaque stool spilling out of the anal canal.  2. Scope inserted and gently advanced around large amount of solid stool into the mid sigmoid colon. There was no evidence of any mucosal abnormality noted anywhere in the rectum and examined sigmoid colon. 3. Numerous diverticula were encountered in the sigmoid colon. Impression: Findings as above. Recommendations:   1. No significant pathology noted. 2. Miralax twice daily to evacuate stool gently and easily, avoid hemorrhoidal swelling / pain. 3. Anusol supp BID for 5 days. 4. General diet. 5. Will sign off at this time.     Ruby Oppenheim MD

## 2022-08-03 LAB
GLUCOSE BLD-MCNC: 109 MG/DL (ref 70–99)
GLUCOSE BLD-MCNC: 169 MG/DL (ref 70–99)
GLUCOSE BLD-MCNC: 92 MG/DL (ref 70–99)
GLUCOSE BLD-MCNC: 95 MG/DL (ref 70–99)

## 2022-08-03 PROCEDURE — 97530 THERAPEUTIC ACTIVITIES: CPT

## 2022-08-03 PROCEDURE — 82962 GLUCOSE BLOOD TEST: CPT

## 2022-08-03 PROCEDURE — 97110 THERAPEUTIC EXERCISES: CPT

## 2022-08-03 PROCEDURE — 97116 GAIT TRAINING THERAPY: CPT

## 2022-08-03 RX ORDER — SODIUM CHLORIDE, SODIUM LACTATE, POTASSIUM CHLORIDE, CALCIUM CHLORIDE 600; 310; 30; 20 MG/100ML; MG/100ML; MG/100ML; MG/100ML
INJECTION, SOLUTION INTRAVENOUS CONTINUOUS
Status: DISCONTINUED | OUTPATIENT
Start: 2022-08-03 | End: 2022-08-06

## 2022-08-03 RX ORDER — MAGNESIUM SULFATE HEPTAHYDRATE 40 MG/ML
2 INJECTION, SOLUTION INTRAVENOUS ONCE
Status: COMPLETED | OUTPATIENT
Start: 2022-08-03 | End: 2022-08-03

## 2022-08-03 RX ORDER — POLYETHYLENE GLYCOL 3350 17 G/17G
17 POWDER, FOR SOLUTION ORAL 2 TIMES DAILY
Qty: 60 EACH | Refills: 0 | Status: SHIPPED | OUTPATIENT
Start: 2022-08-03

## 2022-08-03 NOTE — PLAN OF CARE
8/2 alert, drowsy, very sleepy all day. Npo maintained for flex sig this afternoon. Fleet enema given x2 as ordered. To endo at 1700. Back to room at 1830, very sleepy awakens with verbal stimuli but goes back to sleep again, ivf infusing from endo,vitals monitored, son Zackrey Feliciano notified and updated. Bladder scan done 2x am=shows 206, md notified, rechecked at 3034=104jc, able to void via purwick. No red at this time per dr. Alberto Blandon  To monitor output at this time.     Problem: Diabetes/Glucose Control  Goal: Glucose maintained within prescribed range  Description: INTERVENTIONS:  - Monitor Blood Glucose as ordered  - Assess for signs and symptoms of hyperglycemia and hypoglycemia  - Administer ordered medications to maintain glucose within target range  - Assess barriers to adequate nutritional intake and initiate nutrition consult as needed  - Instruct patient on self management of diabetes  Outcome: Progressing     Problem: Patient/Family Goals  Goal: Patient/Family Long Term Goal  Description: Patient's Long Term Goal:    Interventions:  -   - See additional Care Plan goals for specific interventions  Outcome: Progressing  Goal: Patient/Family Short Term Goal  Description: Patient's Short Term Goal:   7/31 noc: manage pain  8/1NOC: NPO, sleep  Interventions:   - pain meds prn  - See additional Care Plan goals for specific interventions  Outcome: Progressing

## 2022-08-03 NOTE — PLAN OF CARE
Pt remains Aox1 to self, on RA, and NSR on tele. Urine output remains low and started on IVF at 100. Bladder scan showed 317cc of urine and after straight cath, 800cc  obtained. Discharge cancelled today due to family concern of pt not eating and alertness. Will continue to monitor pt for changes. No red at this time. Care endorsed to oncoming RN.      Problem: Diabetes/Glucose Control  Goal: Glucose maintained within prescribed range  Description: INTERVENTIONS:  - Monitor Blood Glucose as ordered  - Assess for signs and symptoms of hyperglycemia and hypoglycemia  - Administer ordered medications to maintain glucose within target range  - Assess barriers to adequate nutritional intake and initiate nutrition consult as needed  - Instruct patient on self management of diabetes  Outcome: Progressing     Problem: Patient/Family Goals  Goal: Patient/Family Long Term Goal  Description: Patient's Long Term Goal:     Interventions:    - See additional Care Plan goals for specific interventions  Outcome: Progressing  Goal: Patient/Family Short Term Goal  Description: Patient's Short Term Goal:   7/31 noc: manage pain  8/1NOC: NPO, sleep  8/2NOC: sleep  Interventions:   - pain meds prn  - See additional Care Plan goals for specific interventions  Outcome: Progressing     Problem: Delirium  Goal: Minimize duration of delirium  Description: Interventions:  - Encourage use of hearing aids, eye glasses  - Promote highest level of mobility daily  - Provide frequent reorientation  - Promote wakefulness i.e. lights on, blinds open  - Promote sleep, encourage patient's normal rest cycle i.e. lights off, TV off, minimize noise and interruptions  - Encourage family to assist in orientation and promotion of home routines  Outcome: Progressing     Problem: MUSCULOSKELETAL - ADULT  Goal: Return mobility to safest level of function  Description: INTERVENTIONS:  - Assess patient stability and activity tolerance for standing, transferring and ambulating w/ or w/o assistive devices  - Assist with transfers and ambulation using safe patient handling equipment as needed  - Ensure adequate protection for wounds/incisions during mobilization  - Obtain PT/OT consults as needed  - Advance activity as appropriate  - Communicate ordered activity level and limitations with patient/family  Outcome: Progressing     Problem: PAIN - ADULT  Goal: Verbalizes/displays adequate comfort level or patient's stated pain goal  Description: INTERVENTIONS:  - Encourage pt to monitor pain and request assistance  - Assess pain using appropriate pain scale  - Administer analgesics based on type and severity of pain and evaluate response  - Implement non-pharmacological measures as appropriate and evaluate response  - Consider cultural and social influences on pain and pain management  - Manage/alleviate anxiety  - Utilize distraction and/or relaxation techniques  - Monitor for opioid side effects  - Notify MD/LIP if interventions unsuccessful or patient reports new pain  - Anticipate increased pain with activity and pre-medicate as appropriate  Outcome: Progressing     Problem: Discharge Barriers  Goal: Patient's discharge needs are met  Description: Collaborate with interdisciplinary team and initiate plans and interventions as needed.    Outcome: Progressing

## 2022-08-04 LAB
ANION GAP SERPL CALC-SCNC: 8 MMOL/L (ref 0–18)
BILIRUB UR QL CFM: NEGATIVE
BUN BLD-MCNC: 30 MG/DL (ref 7–18)
C DIFF TOX B STL QL: NEGATIVE
CALCIUM BLD-MCNC: 8.8 MG/DL (ref 8.5–10.1)
CHLORIDE SERPL-SCNC: 109 MMOL/L (ref 98–112)
CO2 SERPL-SCNC: 23 MMOL/L (ref 21–32)
CREAT BLD-MCNC: 0.88 MG/DL
GFR SERPLBLD BASED ON 1.73 SQ M-ARVRAT: 61 ML/MIN/1.73M2 (ref 60–?)
GLUCOSE BLD-MCNC: 108 MG/DL (ref 70–99)
GLUCOSE BLD-MCNC: 113 MG/DL (ref 70–99)
GLUCOSE BLD-MCNC: 188 MG/DL (ref 70–99)
GLUCOSE BLD-MCNC: 83 MG/DL (ref 70–99)
GLUCOSE BLD-MCNC: 89 MG/DL (ref 70–99)
GLUCOSE UR STRIP.AUTO-MCNC: NEGATIVE MG/DL
KETONES UR STRIP.AUTO-MCNC: 15 MG/DL
NITRITE UR QL STRIP.AUTO: POSITIVE
OSMOLALITY SERPL CALC.SUM OF ELEC: 297 MOSM/KG (ref 275–295)
PH UR STRIP.AUTO: 8.5 [PH] (ref 5–8)
POTASSIUM SERPL-SCNC: 3.5 MMOL/L (ref 3.5–5.1)
PROT UR STRIP.AUTO-MCNC: >=300 MG/DL
RBC #/AREA URNS AUTO: >10 /HPF
RBC #/AREA URNS AUTO: >10 /HPF
SODIUM SERPL-SCNC: 140 MMOL/L (ref 136–145)
SP GR UR STRIP.AUTO: 1.02 (ref 1–1.03)
UROBILINOGEN UR STRIP.AUTO-MCNC: 1 MG/DL
WBC #/AREA URNS AUTO: >50 /HPF
WBC #/AREA URNS AUTO: >50 /HPF
WBC CLUMPS UR QL AUTO: PRESENT /HPF
WBC CLUMPS UR QL AUTO: PRESENT /HPF

## 2022-08-04 PROCEDURE — 80048 BASIC METABOLIC PNL TOTAL CA: CPT | Performed by: HOSPITALIST

## 2022-08-04 PROCEDURE — 87086 URINE CULTURE/COLONY COUNT: CPT | Performed by: PHYSICIAN ASSISTANT

## 2022-08-04 PROCEDURE — 87186 SC STD MICRODIL/AGAR DIL: CPT | Performed by: PHYSICIAN ASSISTANT

## 2022-08-04 PROCEDURE — 87077 CULTURE AEROBIC IDENTIFY: CPT | Performed by: PHYSICIAN ASSISTANT

## 2022-08-04 PROCEDURE — 87493 C DIFF AMPLIFIED PROBE: CPT | Performed by: HOSPITALIST

## 2022-08-04 PROCEDURE — 81015 MICROSCOPIC EXAM OF URINE: CPT | Performed by: PHYSICIAN ASSISTANT

## 2022-08-04 PROCEDURE — 82962 GLUCOSE BLOOD TEST: CPT

## 2022-08-04 PROCEDURE — 81001 URINALYSIS AUTO W/SCOPE: CPT | Performed by: PHYSICIAN ASSISTANT

## 2022-08-04 NOTE — CONSULTS
[facility]    Report of Consultation    Rocio Drake Patient Status:  Observation    3/21/1929 MRN [id number]   [facility] 5NW-A Attending Jamila Pinto MD   Hosp Day # 0 PCP [facility]     Date of Admission:  2022  Date of Consult:  2022    Reason for Consultation:  Gross hematuria    History of Present Illness:  Rocio Drake is a a(n) 80year old female with hx of type II DM, HTN, depression, dementia, who presented to the ED over the weekend for altered mental status with lethargy. No focal etiologies of symptoms, UA negative at time of presentation. She was admitted for further evaluation and mgmt. CT scan a/p showed mild bilateral hydronephrosis with distended bladder and colonic wall thickening c/w colitis. Had endoscopy with GI without focal findings, patient placed on miralax. She has been straight cathed with large residuals and now developed some gross hematuria. She has purewick in place at present and canister empty. Patient denies any difficulties voiding or pain with voiding. Spoke with son and granddaughter and it was relayed that she has chronic urinary incontinence and wears depends. Will be directed to the bathroom periodically by family but does not always void when she goes. Bowels are not consistent. No prior known episodes of gross hematuria. No hx of UTI, stones. History:  Past Medical History:   Diagnosis Date    Diabetes [facility])      Past Surgical History:   Procedure Laterality Date    OTHER      Cardiac sx: Triple bypass     No family history on file. reports that she has quit smoking. She has never used smokeless tobacco. She reports that she does not drink alcohol and does not use drugs.     Allergies:    Penicillins             UNKNOWN    Medications:    Current Facility-Administered Medications:     lactated ringers infusion, , Intravenous, Continuous    polyethylene glycol (PEG 3350) (Miralax) 17 g oral packet 17 g, 17 g, Oral, BID    hydrocortisone (Anusol-HC) 25 MG rectal suppository 25 mg, 25 mg, Rectal, BID    insulin detemir (LEVEMIR) 100 UNIT/ML flextouch 5 Units, 5 Units, Subcutaneous, Daily    insulin detemir (LEVEMIR) 100 UNIT/ML flextouch 20 Units, 20 Units, Subcutaneous, Daily    atorvastatin (Lipitor) tab 10 mg, 10 mg, Oral, Nightly    enalapril (Vasotec) tab 20 mg, 20 mg, Oral, Daily    escitalopram (Lexapro) tab 10 mg, 10 mg, Oral, Daily    memantine (Namenda) tab 10 mg, 10 mg, Oral, BID    melatonin tab 3 mg, 3 mg, Oral, Nightly PRN    ondansetron (ZOFRAN) injection 4 mg, 4 mg, Intravenous, Q6H PRN    metoclopramide (REGLAN) injection 5 mg, 5 mg, Intravenous, Q8H PRN    [Held by provider] heparin 5000 UNIT/ML injection 5,000 Units, 5,000 Units, Subcutaneous, Q8H VANNESA    acetaminophen (Tylenol Extra Strength) tab 500 mg, 500 mg, Oral, Q4H PRN    sennosides (Senokot) tab 17.2 mg, 17.2 mg, Oral, Nightly PRN    bisacodyl (Dulcolax) 10 MG rectal suppository 10 mg, 10 mg, Rectal, Daily PRN    glucose (Dex4) 15 GM/59ML oral liquid 15 g, 15 g, Oral, Q15 Min PRN **OR** glucose (Glutose) 40 % oral gel 15 g, 15 g, Oral, Q15 Min PRN **OR** glucose-vitamin C (Dex-4) chewable tab 4 tablet, 4 tablet, Oral, Q15 Min PRN **OR** dextrose 50 % injection 50 mL, 50 mL, Intravenous, Q15 Min PRN **OR** glucose (Dex4) 15 GM/59ML oral liquid 30 g, 30 g, Oral, Q15 Min PRN **OR** glucose (Glutose) 40 % oral gel 30 g, 30 g, Oral, Q15 Min PRN **OR** glucose-vitamin C (Dex-4) chewable tab 8 tablet, 8 tablet, Oral, Q15 Min PRN    Insulin Aspart Pen (NOVOLOG) 100 UNIT/ML flexpen 1-10 Units, 1-10 Units, Subcutaneous, TID AC and HS    benzocaine (Americaine) 20 % rectal ointment, , Rectal, Q4H PRN    Review of Systems:  Review of systems not obtained due to patient factors.     Physical Exam:  No distress, resting in bed, with eyes closed, periodically will open  Non labored respirations  Abdomen soft, NTND  No CVAT  purewick canister dry - looks like old blood in the bottom    Laboratory Data:  Lab Results   Component Value Date    CREATSERUM 0.88 08/04/2022    BUN 30 08/04/2022     08/04/2022    K 3.5 08/04/2022     08/04/2022    CO2 23.0 08/04/2022     08/04/2022    CA 8.8 08/04/2022       Urinalysis Results (last three years):  Recent Labs     07/30/22  2352   COLORUR Yellow   CLARITY Clear   SPECGRAVITY 1.025   PHURINE 6.0   PROUR 30 mg/dL*   GLUUR 500 *   KETUR Trace*   BILUR Negative   BLOODURINE Negative   NITRITE Negative   UROBILINOGEN 0.2   LEUUR Negative   WBCUR 1-5   RBCUR 0-2   BACUR Rare*       Urine Culture Results (last three years):  No results found for: URINECUL    Imaging  CT BRAIN OR HEAD (43246)    Result Date: 7/31/2022  PROCEDURE:  CT BRAIN OR HEAD (63690)  LOCATION:                                       COMPARISON:  None. INDICATIONS:  AMS  TECHNIQUE:  Noncontrast CT scanning is performed through the brain. Dose reduction techniques were used. Dose information is transmitted to the [facility]) NRDR ([address]) which includes the Dose Index Registry. PATIENT STATED HISTORY: (As transcribed by Technologist)  AMS, confusion, lethargy   FINDINGS:   VENTRICLES/SULCI:   Mild to moderate diffuse cerebral and cerebellar atrophy. INTRACRANIAL:  There is moderate decreased attenuation in the periventricular white matter both cerebral hemispheres nonspecific although compatible with chronic microvascular ischemic changes of aging. There are no abnormal extraaxial fluid collections. There is no midline shift. No evidence of acute intracranial hemorrhage. SINUSES:  No significant inflammatory changes. MASTOIDS:  The mastoids are clear. SKULL:  No depressed calvarial fracture. CONCLUSION:  No CT evidence for acute intracranial hemorrhage. Mild-to-moderate cerebral and cerebellar atrophy with moderate chronic microvascular ischemic changes of aging.   Dictated by (CST): Janene Evon Jackson MD on 7/31/2022 at 7:43 AM     Finalized by (CST): Velma Bernabe MD on 7/31/2022 at 7:45 AM       CT ABDOMEN+PELVIS(CONTRAST ONLY)(CPT=74177)    Result Date: 7/31/2022  PROCEDURE:  CT ABDOMEN+PELVIS (CONTRAST ONLY) (UBA=49235)  LOCATION:  [facility]   COMPARISON:  None. INDICATIONS:  Rectal pain  TECHNIQUE:  CT scanning was performed from the dome of the diaphragm to the pubic symphysis with non-ionic intravenous contrast material. Post contrast coronal MPR imaging was performed. Dose reduction techniques were used. Dose information is transmitted to the [facility]) NRDR ([address]) which includes the Dose Index Registry. PATIENT STATED HISTORY:(As transcribed by Technologist)  Patient has rectal pain   CONTRAST USED:  80cc of Omnipaque 350  FINDINGS:  LIVER:  No enlargement, atrophy, abnormal density, or significant focal lesion. 7 mm calcification within the posterior segment. BILIARY:  Cholecystectomy. No visible dilatation or calcification. PANCREAS:  Diffuse atrophy with diffuse pancreatic ductal dilatation measuring up to 1.2 cm at the level of the pancreatic head. Several small cystic foci are present within the distal body and tail. Findings suggest IPMN. SPLEEN:  No enlargement or focal lesion. KIDNEYS:  Mild bilateral hydronephrosis with diffuse hydroureter. No obstructing calcification. ADRENALS:  Left adrenal 1.6 x 2.5 cm nodular mass. AORTA/VASCULAR:  Marked atherosclerosis particularly involves the proximal superior mesenteric artery which is patent. Inferior mesenteric artery is also patent. At the level of the TEO is a focal, 1.2 cm nonenhancing protrusion extending from the right abdominal aorta, a thrombosed pseudoaneurysm cannot be excluded. RETROPERITONEUM:  No mass or adenopathy. BOWEL/MESENTERY:  Normal caliber small and large bowel including the appendix.   Bowel wall thickening involves the transverse colon extending to the proximal descending colon. Marked rectal wall thickening with infiltration of the adjacent fat is most consistent with proctitis. No free fluid. ABDOMINAL WALL:  No mass or hernia. URINARY BLADDER:  Bladder is distended with internal air most likely iatrogenic from Wilson catheter. PELVIC NODES:  No adenopathy. PELVIC ORGANS:  Multiple calcifications most likely represent degenerating fibroids. Uterine atrophy appropriate for patient age. BONES:  Grade 1 anterolisthesis at the lumbar sacral level with facet joint arthropathy. Diffuse degenerative change. No fracture. LUNG BASES:  Bilateral dependent atelectasis. OTHER:  Moderate size hiatal hernia. CONCLUSION:  1. Bowel wall thickening involves the transverse colon as well as the rectum and is most consistent with colitis and proctitis respectively. 2. Diffuse pancreatic atrophy with ductal dilatation as small cystic foci within the distal body and tail suggesting IPMN. If patient has prior studies these would be helpful for further evaluation and to assess stability of the above findings. Correlate clinically. 3. 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 4. Bladder is significantly distended with fluid with mild bilateral hydronephrosis and hydroureter. Patient would benefit from Wilson catheter placement. Dictated by (CST): Mark Yip MD on 7/31/2022 at 6:19 PM     Finalized by (CST): Mark Yip MD on 7/31/2022 at 6:32 PM       XR CHEST AP PORTABLE  (CPT=71045)    Result Date: 7/30/2022  PROCEDURE:  XR CHEST AP PORTABLE  (CPT=71045)  LOCATION:                                       TECHNIQUE:  AP chest radiograph was obtained. COMPARISON:  None. INDICATIONS:  AMS  PATIENT STATED HISTORY: (As transcribed by Technologist)  Patient offered no additional history at this time. FINDINGS:  Median sternotomy changes noted. Cardiomegaly with normal pulmonary vascularity.   Tortuous thoracic aorta with calcified plaque. No pleural effusion or pneumothorax. Degenerative changes in the spine. Hyperinflation of the lungs suggesting the possibility of underlying COPD and/or asthma. Clinical correlation recommended. CONCLUSION:  No lobar pneumonia or overt congestive failure. Hyperinflation of the lungs suggesting the possibility of underlying COPD and/or asthma. Clinical correlation recommended. Dictated by (CST): Leeroy Maxwell MD on 7/30/2022 at 11:17 PM     Finalized by (CST): Leeroy Maxwell MD on 7/30/2022 at 11:18 PM             Impression:  Patient Active Problem List:     Acute encephalopathy     Hyperglycemia     Type 2 diabetes mellitus without complication, without long-term current use of insulin (HCC)     Primary hypertension     Pure hypercholesterolemia     Late onset Alzheimer's dementia without behavioral disturbance [facility])     Delirium     Palliative care encounter     Counseling regarding advance care planning and goals of care     Colitis     Proctitis     Constipation     Dehydration     Urinary retention      79 yo F with hx of type II DM, dementia with new onset of gross hematuria. UA at time of admission negative. CT scan with distended bladder and mild bilateral hydronephrosis, no stones or masses. Straight catheterization with large residuals. Reviewed with patient, son, granddaughter, and nursing staff recommend red catheter placement and repeat urinalysis with culture. Pending UA will initiate antibiotic therapy. Discussed with family potential for baseline incomplete emptying vs exacerbation from constipation or weakness. Would recommend red for 5-7 days with voiding trial to be completed at facility. Recommendations: Red catheter placement. Check UA and culture. Treatment pending. May have voiding trial at facility. If ongoing retention may need to consider a chronic red catheter with monthly changes.      Thank you for allowing me to participate in the care of your patient. Patrick Toure PA-C  [facility]  8/4/2022  9:05 AM        I reviewed the patient's clinical course, lab data and x-rays and agree with the plan formulated by the physician assistant.     Wendie Whittington MD FACS

## 2022-08-04 NOTE — PALLIATIVE CARE NOTE
Consult placed per Dr. Spenser Mckenzie, patient has been seen during this admission. Palliative care is peripherally following if there is a change a change in her condition requiring new goals of care. New consult order canceled following a discussion with Dr. Spenser Mckenzie.      [address] [place] [address]   Palliative care  [phone]

## 2022-08-04 NOTE — PROGRESS NOTES
08/04/22 0532   Provider Notification   Reason for Communication Review case   Provider Name Other (comment)  (bhavin)   Method of Communication Page   Response Waiting for response   Notification Time 0532   Pt receiving LR at 100, no urine output per purewick. Bladder scan last night was 177, this am was 393. Any orders?  thanks

## 2022-08-04 NOTE — PROGRESS NOTES
08/04/22 0645   Provider Notification   Reason for Communication Review case   Provider Name Other (comment)  (bhavin)   Method of Communication Page   Response Waiting for response   Notification Time 0645   Pt straight cath x1, urine op 725, still draining dark brown bloody urine with odor. Pt has BMP ordered for this morning. Any new orders?  thanks

## 2022-08-05 LAB
GLUCOSE BLD-MCNC: 160 MG/DL (ref 70–99)
GLUCOSE BLD-MCNC: 241 MG/DL (ref 70–99)
GLUCOSE BLD-MCNC: 261 MG/DL (ref 70–99)
GLUCOSE BLD-MCNC: 362 MG/DL (ref 70–99)

## 2022-08-05 PROCEDURE — 97110 THERAPEUTIC EXERCISES: CPT

## 2022-08-05 PROCEDURE — 97530 THERAPEUTIC ACTIVITIES: CPT

## 2022-08-05 PROCEDURE — 82962 GLUCOSE BLOOD TEST: CPT

## 2022-08-05 PROCEDURE — 97535 SELF CARE MNGMENT TRAINING: CPT

## 2022-08-05 NOTE — PHYSICAL THERAPY NOTE
PHYSICAL THERAPY TREATMENT NOTE - INPATIENT    Room Number: 720/142-F     Session: 2     Number of Visits to Meet Established Goals: 4    Presenting Problem: acute encephalopathy  Co-Morbidities : DM, HTN, depression, dementia, HLD     History related to current admission: Patient is a 80year old female admitted on 2022 from home for AMS and lethargy x2 days. Pt diagnosed with acute encephalopathy   ASSESSMENT     Pt very sleepy and lethargic during session. Pt able to participate in therapeutic ex with repeated verbal and tactile cues. Pt opens eyes during supine to sit but does not keep eyes open. Pt continues to present with decrease strength, lethargy, decrease activity tolerance due to lethargy, decrease balance, and decrease functional mobility skills. Pt may benefit from continued skilled PT to address above deficits and facilitate return to highest level functional independence. DISCHARGE RECOMMENDATIONS  PT Discharge Recommendations: Sub-acute rehabilitation     PLAN  PT Treatment Plan: Bed mobility; Endurance; Energy conservation;Patient education; Family education;Gait training;Strengthening;Stair training;Transfer training;Balance training  Rehab Potential : Good  Frequency (Obs): 3-5x/week    CURRENT GOALS     Goal #1 Patient is able to demonstrate supine - sit EOB @ level: supervision      Goal #2 Patient is able to demonstrate transfers Sit to/from Stand at assistance level: supervision      Goal #3 Patient is able to ambulate 100 feet with assist device: walker - rolling at assistance level: minimum assistance      Goal #4     Goal #5     Goal #6     Goal Comments: Goals established on 2022 all goals ongoing      SUBJECTIVE  \"no\" when asked if pt has pain.     OBJECTIVE  Precautions: Bed/chair alarm    WEIGHT BEARING RESTRICTION  Weight Bearing Restriction: None                PAIN ASSESSMENT   Ratin  Location: denies pain at this time  Management Techniques: Activity promotion;Repositioning    BALANCE                                                                                                                       Static Sitting: Poor +  Dynamic Sitting: Poor +           Static Standing: Not tested  Dynamic Standing: Not tested    ACTIVITY TOLERANCE                         O2 WALK         AM-PAC '6-Clicks' INPATIENT SHORT FORM - BASIC MOBILITY  How much difficulty does the patient currently have. .. Patient Difficulty: Turning over in bed (including adjusting bedclothes, sheets and blankets)?: A Little   Patient Difficulty: Sitting down on and standing up from a chair with arms (e.g., wheelchair, bedside commode, etc.): A Lot   Patient Difficulty: Moving from lying on back to sitting on the side of the bed?: A Little   How much help from another person does the patient currently need. .. Help from Another: Moving to and from a bed to a chair (including a wheelchair)?: A Lot   Help from Another: Need to walk in hospital room?: A Lot   Help from Another: Climbing 3-5 steps with a railing?: Total       AM-PAC Score:  Raw Score: 13   Approx Degree of Impairment: 64.91%   Standardized Score (AM-PAC Scale): 36.74   CMS Modifier (G-Code): CL    FUNCTIONAL ABILITY STATUS  Gait Assessment   Functional Mobility/Gait Assessment  Gait Assistance: Not tested  Distance (ft): 4  Assistive Device: Rolling walker  Pattern: Shuffle;R Foot flat;L Foot flat    Skilled Therapy Provided    Bed Mobility:  Rolling right: not tested   Rolling left: min assist    Supine<>Sit: mod assist   Sit<>Supine: mod assist     Transfer Mobility:  Sit<>Stand: not tested   Stand<>Sit: not tested   Gait: not tested    Therapist's Comments: per RN pt ok to be seen. Pt son initially in room with pt and leaves prior to PT session. Pt received in bed asleep. Pt wakes to pt name but does not keep eyes open. Pt able to complete ex below, with repeated tactile and verbal cues.   Pt was seated at EOB with mod assist for supine to sit with HOB elevated. Pt was able to tolerate less than 1 minute sitting, and was not able to stay awake. Pt noted to have soiled brief. Pt returned to supine with mod assist.  Pt PCT made aware eariler, and RN made aware of pt needing to be changed. Pt left in supine with call light and needs within reach, bed alarm set and pt asleep. THERAPEUTIC EXERCISES  Lower Extremity Ankle pumps  Hip AB/AD  Heel slides     Upper Extremity n/a     Position Supine with HOB elevated     Repetitions   10   Sets   1-2     Patient End of Session: In bed;Needs met;Call light within reach;RN aware of session/findings; All patient questions and concerns addressed; Alarm set

## 2022-08-06 LAB
ANION GAP SERPL CALC-SCNC: 6 MMOL/L (ref 0–18)
BUN BLD-MCNC: 16 MG/DL (ref 7–18)
CALCIUM BLD-MCNC: 7.8 MG/DL (ref 8.5–10.1)
CHLORIDE SERPL-SCNC: 106 MMOL/L (ref 98–112)
CO2 SERPL-SCNC: 26 MMOL/L (ref 21–32)
CREAT BLD-MCNC: 0.62 MG/DL
ERYTHROCYTE [DISTWIDTH] IN BLOOD BY AUTOMATED COUNT: 13.7 %
GFR SERPLBLD BASED ON 1.73 SQ M-ARVRAT: 83 ML/MIN/1.73M2 (ref 60–?)
GLUCOSE BLD-MCNC: 184 MG/DL (ref 70–99)
GLUCOSE BLD-MCNC: 282 MG/DL (ref 70–99)
GLUCOSE BLD-MCNC: 288 MG/DL (ref 70–99)
GLUCOSE BLD-MCNC: 315 MG/DL (ref 70–99)
GLUCOSE BLD-MCNC: 382 MG/DL (ref 70–99)
GLUCOSE BLD-MCNC: 387 MG/DL (ref 70–99)
HCT VFR BLD AUTO: 35 %
HGB BLD-MCNC: 11.1 G/DL
MCH RBC QN AUTO: 26.9 PG (ref 26–34)
MCHC RBC AUTO-ENTMCNC: 31.7 G/DL (ref 31–37)
MCV RBC AUTO: 84.7 FL
OSMOLALITY SERPL CALC.SUM OF ELEC: 303 MOSM/KG (ref 275–295)
PLATELET # BLD AUTO: 213 10(3)UL (ref 150–450)
POTASSIUM SERPL-SCNC: 3.8 MMOL/L (ref 3.5–5.1)
RBC # BLD AUTO: 4.13 X10(6)UL
SODIUM SERPL-SCNC: 138 MMOL/L (ref 136–145)
WBC # BLD AUTO: 11.2 X10(3) UL (ref 4–11)

## 2022-08-06 PROCEDURE — 82962 GLUCOSE BLOOD TEST: CPT

## 2022-08-06 PROCEDURE — 85027 COMPLETE CBC AUTOMATED: CPT | Performed by: INTERNAL MEDICINE

## 2022-08-06 PROCEDURE — 80048 BASIC METABOLIC PNL TOTAL CA: CPT | Performed by: INTERNAL MEDICINE

## 2022-08-06 RX ORDER — CEPHALEXIN 500 MG/1
500 CAPSULE ORAL EVERY 8 HOURS SCHEDULED
Status: DISCONTINUED | OUTPATIENT
Start: 2022-08-06 | End: 2022-08-07

## 2022-08-06 RX ORDER — HYDROCORTISONE ACETATE 25 MG/1
25 SUPPOSITORY RECTAL 2 TIMES DAILY
Qty: 24 EACH | Refills: 0 | Status: SHIPPED | OUTPATIENT
Start: 2022-08-06

## 2022-08-07 VITALS
HEART RATE: 90 BPM | OXYGEN SATURATION: 94 % | RESPIRATION RATE: 16 BRPM | SYSTOLIC BLOOD PRESSURE: 147 MMHG | HEIGHT: 65 IN | BODY MASS INDEX: 25.91 KG/M2 | DIASTOLIC BLOOD PRESSURE: 69 MMHG | TEMPERATURE: 98 F | WEIGHT: 155.5 LBS

## 2022-08-07 LAB
GLUCOSE BLD-MCNC: 179 MG/DL (ref 70–99)
GLUCOSE BLD-MCNC: 191 MG/DL (ref 70–99)

## 2022-08-07 PROCEDURE — 82962 GLUCOSE BLOOD TEST: CPT

## 2022-08-07 RX ORDER — CEPHALEXIN 500 MG/1
500 CAPSULE ORAL EVERY 8 HOURS SCHEDULED
Qty: 24 CAPSULE | Refills: 0 | Status: SHIPPED | OUTPATIENT
Start: 2022-08-07 | End: 2022-08-15

## 2022-08-07 NOTE — PROGRESS NOTES
NURSING DISCHARGE NOTE    Discharged Nursing home via Ambulance. Accompanied by Support staff  Belongings Taken by patient/family. Discharge instructions explained to patient and son at bedside. IV removed. Patient's belongings taken by patient's son. No further questions at this time. Report called to The [address] [place] [address] at 3:30pm, spoke to [facility].

## 2022-08-07 NOTE — PROGRESS NOTES
Patient seen and examined. Discharge if ok with consultants. Hospitalist portion of med rec completed.     Becki Alvarado MD  [facility]  Internal Medicine Hospitalist

## 2022-08-07 NOTE — CM/SW NOTE
Patient will dc to The [address] [place] [address] - phone # [phone]  Today at 4:30pm via [facility]. PCS form completed.     Daniel Guajardo LCSW

## 2022-08-08 ENCOUNTER — EXTERNAL FACILITY (OUTPATIENT)
Dept: FAMILY MEDICINE CLINIC | Facility: CLINIC | Age: 87
End: 2022-08-08

## 2022-08-08 DIAGNOSIS — G93.40 ACUTE ENCEPHALOPATHY: Primary | ICD-10-CM

## 2022-08-08 DIAGNOSIS — G30.1 LATE ONSET ALZHEIMER'S DEMENTIA WITHOUT BEHAVIORAL DISTURBANCE (HCC): ICD-10-CM

## 2022-08-08 DIAGNOSIS — R33.9 URINARY RETENTION: ICD-10-CM

## 2022-08-08 DIAGNOSIS — N13.30 BILATERAL HYDRONEPHROSIS: ICD-10-CM

## 2022-08-08 DIAGNOSIS — N30.01 ACUTE CYSTITIS WITH HEMATURIA: ICD-10-CM

## 2022-08-08 DIAGNOSIS — F02.80 LATE ONSET ALZHEIMER'S DEMENTIA WITHOUT BEHAVIORAL DISTURBANCE (HCC): ICD-10-CM

## 2022-08-08 DIAGNOSIS — E11.9 TYPE 2 DIABETES MELLITUS WITHOUT COMPLICATION, WITHOUT LONG-TERM CURRENT USE OF INSULIN (HCC): ICD-10-CM

## 2022-08-08 PROCEDURE — 1111F DSCHRG MED/CURRENT MED MERGE: CPT | Performed by: FAMILY MEDICINE

## 2022-08-08 PROCEDURE — 99306 1ST NF CARE HIGH MDM 50: CPT | Performed by: FAMILY MEDICINE

## 2022-08-09 ENCOUNTER — INITIAL APN SNF VISIT (OUTPATIENT)
Dept: INTERNAL MEDICINE CLINIC | Age: 87
End: 2022-08-09

## 2022-08-09 DIAGNOSIS — E11.9 TYPE 2 DIABETES MELLITUS WITHOUT COMPLICATION, WITHOUT LONG-TERM CURRENT USE OF INSULIN (HCC): ICD-10-CM

## 2022-08-09 DIAGNOSIS — R33.9 URINARY RETENTION: ICD-10-CM

## 2022-08-09 DIAGNOSIS — Z78.9 DECREASED ACTIVITIES OF DAILY LIVING (ADL): ICD-10-CM

## 2022-08-09 DIAGNOSIS — E78.00 PURE HYPERCHOLESTEROLEMIA: ICD-10-CM

## 2022-08-09 DIAGNOSIS — R31.0 GROSS HEMATURIA: ICD-10-CM

## 2022-08-09 DIAGNOSIS — N13.30 BILATERAL HYDRONEPHROSIS: ICD-10-CM

## 2022-08-09 DIAGNOSIS — G30.1 LATE ONSET ALZHEIMER'S DEMENTIA WITHOUT BEHAVIORAL DISTURBANCE (HCC): ICD-10-CM

## 2022-08-09 DIAGNOSIS — F02.80 LATE ONSET ALZHEIMER'S DEMENTIA WITHOUT BEHAVIORAL DISTURBANCE (HCC): ICD-10-CM

## 2022-08-09 DIAGNOSIS — I10 PRIMARY HYPERTENSION: ICD-10-CM

## 2022-08-09 DIAGNOSIS — R53.1 WEAKNESS: ICD-10-CM

## 2022-08-09 DIAGNOSIS — N30.01 ACUTE CYSTITIS WITH HEMATURIA: Primary | ICD-10-CM

## 2022-08-09 PROCEDURE — 1111F DSCHRG MED/CURRENT MED MERGE: CPT | Performed by: NURSE PRACTITIONER

## 2022-08-09 PROCEDURE — 99310 SBSQ NF CARE HIGH MDM 45: CPT | Performed by: NURSE PRACTITIONER

## 2022-08-10 VITALS
DIASTOLIC BLOOD PRESSURE: 79 MMHG | OXYGEN SATURATION: 94 % | TEMPERATURE: 98 F | BODY MASS INDEX: 26 KG/M2 | SYSTOLIC BLOOD PRESSURE: 128 MMHG | RESPIRATION RATE: 18 BRPM | WEIGHT: 155 LBS | HEART RATE: 75 BPM

## 2022-08-11 ENCOUNTER — SNF VISIT (OUTPATIENT)
Dept: INTERNAL MEDICINE CLINIC | Age: 87
End: 2022-08-11

## 2022-08-11 VITALS
HEART RATE: 64 BPM | DIASTOLIC BLOOD PRESSURE: 63 MMHG | WEIGHT: 155 LBS | OXYGEN SATURATION: 97 % | BODY MASS INDEX: 26 KG/M2 | TEMPERATURE: 97 F | SYSTOLIC BLOOD PRESSURE: 121 MMHG | RESPIRATION RATE: 18 BRPM

## 2022-08-11 DIAGNOSIS — Z78.9 DECREASED ACTIVITIES OF DAILY LIVING (ADL): ICD-10-CM

## 2022-08-11 DIAGNOSIS — E11.9 TYPE 2 DIABETES MELLITUS WITHOUT COMPLICATION, WITHOUT LONG-TERM CURRENT USE OF INSULIN (HCC): ICD-10-CM

## 2022-08-11 DIAGNOSIS — G30.1 LATE ONSET ALZHEIMER'S DEMENTIA WITHOUT BEHAVIORAL DISTURBANCE (HCC): ICD-10-CM

## 2022-08-11 DIAGNOSIS — R53.1 WEAKNESS: ICD-10-CM

## 2022-08-11 DIAGNOSIS — N30.01 ACUTE CYSTITIS WITH HEMATURIA: Primary | ICD-10-CM

## 2022-08-11 DIAGNOSIS — F02.80 LATE ONSET ALZHEIMER'S DEMENTIA WITHOUT BEHAVIORAL DISTURBANCE (HCC): ICD-10-CM

## 2022-08-11 DIAGNOSIS — I10 PRIMARY HYPERTENSION: ICD-10-CM

## 2022-08-11 DIAGNOSIS — R33.9 URINARY RETENTION: ICD-10-CM

## 2022-08-11 PROCEDURE — 1111F DSCHRG MED/CURRENT MED MERGE: CPT | Performed by: NURSE PRACTITIONER

## 2022-08-11 PROCEDURE — 99309 SBSQ NF CARE MODERATE MDM 30: CPT | Performed by: NURSE PRACTITIONER

## 2022-08-17 ENCOUNTER — SNF VISIT (OUTPATIENT)
Dept: INTERNAL MEDICINE CLINIC | Age: 87
End: 2022-08-17

## 2022-08-17 DIAGNOSIS — G30.1 LATE ONSET ALZHEIMER'S DEMENTIA WITHOUT BEHAVIORAL DISTURBANCE (HCC): ICD-10-CM

## 2022-08-17 DIAGNOSIS — R53.1 WEAKNESS: ICD-10-CM

## 2022-08-17 DIAGNOSIS — E11.9 TYPE 2 DIABETES MELLITUS WITHOUT COMPLICATION, WITHOUT LONG-TERM CURRENT USE OF INSULIN (HCC): Primary | ICD-10-CM

## 2022-08-17 DIAGNOSIS — I10 PRIMARY HYPERTENSION: ICD-10-CM

## 2022-08-17 DIAGNOSIS — F02.80 LATE ONSET ALZHEIMER'S DEMENTIA WITHOUT BEHAVIORAL DISTURBANCE (HCC): ICD-10-CM

## 2022-08-17 DIAGNOSIS — Z78.9 DECREASED ACTIVITIES OF DAILY LIVING (ADL): ICD-10-CM

## 2022-08-17 PROCEDURE — 99309 SBSQ NF CARE MODERATE MDM 30: CPT | Performed by: NURSE PRACTITIONER

## 2022-08-17 PROCEDURE — 1111F DSCHRG MED/CURRENT MED MERGE: CPT | Performed by: NURSE PRACTITIONER

## 2022-08-18 VITALS
HEART RATE: 69 BPM | DIASTOLIC BLOOD PRESSURE: 70 MMHG | BODY MASS INDEX: 26 KG/M2 | OXYGEN SATURATION: 94 % | RESPIRATION RATE: 20 BRPM | SYSTOLIC BLOOD PRESSURE: 136 MMHG | TEMPERATURE: 98 F | WEIGHT: 155 LBS

## 2022-08-22 ENCOUNTER — MED REC SCAN ONLY (OUTPATIENT)
Dept: FAMILY MEDICINE CLINIC | Facility: CLINIC | Age: 87
End: 2022-08-22

## 2022-08-24 ENCOUNTER — SNF DISCHARGE (OUTPATIENT)
Dept: INTERNAL MEDICINE CLINIC | Age: 87
End: 2022-08-24

## 2022-08-24 VITALS
BODY MASS INDEX: 26 KG/M2 | RESPIRATION RATE: 18 BRPM | TEMPERATURE: 97 F | SYSTOLIC BLOOD PRESSURE: 128 MMHG | OXYGEN SATURATION: 92 % | WEIGHT: 155 LBS | DIASTOLIC BLOOD PRESSURE: 67 MMHG | HEART RATE: 69 BPM

## 2022-08-24 DIAGNOSIS — E11.9 TYPE 2 DIABETES MELLITUS WITHOUT COMPLICATION, WITHOUT LONG-TERM CURRENT USE OF INSULIN (HCC): ICD-10-CM

## 2022-08-24 DIAGNOSIS — R31.0 GROSS HEMATURIA: ICD-10-CM

## 2022-08-24 DIAGNOSIS — Z78.9 DECREASED ACTIVITIES OF DAILY LIVING (ADL): ICD-10-CM

## 2022-08-24 DIAGNOSIS — G30.1 LATE ONSET ALZHEIMER'S DEMENTIA WITHOUT BEHAVIORAL DISTURBANCE (HCC): ICD-10-CM

## 2022-08-24 DIAGNOSIS — I10 PRIMARY HYPERTENSION: ICD-10-CM

## 2022-08-24 DIAGNOSIS — N30.01 ACUTE CYSTITIS WITH HEMATURIA: Primary | ICD-10-CM

## 2022-08-24 DIAGNOSIS — N13.30 BILATERAL HYDRONEPHROSIS: ICD-10-CM

## 2022-08-24 DIAGNOSIS — F02.80 LATE ONSET ALZHEIMER'S DEMENTIA WITHOUT BEHAVIORAL DISTURBANCE (HCC): ICD-10-CM

## 2022-08-24 DIAGNOSIS — R53.1 WEAKNESS: ICD-10-CM

## 2022-08-24 PROCEDURE — 99316 NF DSCHRG MGMT 30 MIN+: CPT | Performed by: NURSE PRACTITIONER

## 2022-08-24 PROCEDURE — 1111F DSCHRG MED/CURRENT MED MERGE: CPT | Performed by: NURSE PRACTITIONER

## 2022-08-24 RX ORDER — CIPROFLOXACIN 250 MG/1
250 TABLET, FILM COATED ORAL 2 TIMES DAILY
COMMUNITY
Start: 2022-08-24 | End: 2022-08-28

## 2022-12-12 ENCOUNTER — HOSPITAL ENCOUNTER (INPATIENT)
Facility: HOSPITAL | Age: 87
LOS: 3 days | Discharge: NURSING, REHAB OR LONG-TERM CARE | DRG: 070 | End: 2022-12-15
Attending: EMERGENCY MEDICINE | Admitting: INTERNAL MEDICINE
Payer: MEDICARE

## 2022-12-12 ENCOUNTER — APPOINTMENT (OUTPATIENT)
Dept: GENERAL RADIOLOGY | Facility: HOSPITAL | Age: 87
End: 2022-12-12
Attending: INTERNAL MEDICINE
Payer: MEDICARE

## 2022-12-12 ENCOUNTER — HOSPITAL ENCOUNTER (INPATIENT)
Facility: HOSPITAL | Age: 87
LOS: 3 days | Discharge: NURSING, REHAB OR LONG-TERM CARE | End: 2022-12-15
Attending: EMERGENCY MEDICINE | Admitting: INTERNAL MEDICINE
Payer: MEDICARE

## 2022-12-12 ENCOUNTER — APPOINTMENT (OUTPATIENT)
Dept: CT IMAGING | Facility: HOSPITAL | Age: 87
End: 2022-12-12
Attending: EMERGENCY MEDICINE
Payer: MEDICARE

## 2022-12-12 ENCOUNTER — APPOINTMENT (OUTPATIENT)
Dept: GENERAL RADIOLOGY | Facility: HOSPITAL | Age: 87
DRG: 070 | End: 2022-12-12
Attending: INTERNAL MEDICINE
Payer: MEDICARE

## 2022-12-12 ENCOUNTER — APPOINTMENT (OUTPATIENT)
Dept: CT IMAGING | Facility: HOSPITAL | Age: 87
DRG: 070 | End: 2022-12-12
Attending: EMERGENCY MEDICINE
Payer: MEDICARE

## 2022-12-12 DIAGNOSIS — R41.82 ALTERED MENTAL STATUS, UNSPECIFIED ALTERED MENTAL STATUS TYPE: Primary | ICD-10-CM

## 2022-12-12 DIAGNOSIS — N39.0 URINARY TRACT INFECTION WITHOUT HEMATURIA, SITE UNSPECIFIED: ICD-10-CM

## 2022-12-12 DIAGNOSIS — E16.2 HYPOGLYCEMIA: ICD-10-CM

## 2022-12-12 PROBLEM — G93.40 ENCEPHALOPATHY: Status: ACTIVE | Noted: 2022-01-01

## 2022-12-12 PROBLEM — G93.40 ENCEPHALOPATHY: Status: ACTIVE | Noted: 2022-12-12

## 2022-12-12 LAB
ALBUMIN SERPL-MCNC: 3 G/DL (ref 3.4–5)
ALBUMIN/GLOB SERPL: 0.7 {RATIO} (ref 1–2)
ALP LIVER SERPL-CCNC: 84 U/L
ALT SERPL-CCNC: 12 U/L
AMPHET UR QL SCN: NEGATIVE
ANION GAP SERPL CALC-SCNC: 2 MMOL/L (ref 0–18)
AST SERPL-CCNC: 12 U/L (ref 15–37)
ATRIAL RATE: 63 BPM
BASE EXCESS BLDV CALC-SCNC: 2.5 MMOL/L
BASOPHILS # BLD AUTO: 0.05 X10(3) UL (ref 0–0.2)
BASOPHILS NFR BLD AUTO: 0.7 %
BENZODIAZ UR QL SCN: NEGATIVE
BILIRUB SERPL-MCNC: 0.4 MG/DL (ref 0.1–2)
BILIRUB UR QL STRIP.AUTO: NEGATIVE
BUN BLD-MCNC: 11 MG/DL (ref 7–18)
CALCIUM BLD-MCNC: 9.1 MG/DL (ref 8.5–10.1)
CANNABINOIDS UR QL SCN: NEGATIVE
CHLORIDE SERPL-SCNC: 111 MMOL/L (ref 98–112)
CO2 SERPL-SCNC: 29 MMOL/L (ref 21–32)
COCAINE UR QL: NEGATIVE
COLOR UR AUTO: YELLOW
CREAT BLD-MCNC: 0.87 MG/DL
CREAT UR-SCNC: 74.4 MG/DL
EOSINOPHIL # BLD AUTO: 0.23 X10(3) UL (ref 0–0.7)
EOSINOPHIL NFR BLD AUTO: 3.4 %
ERYTHROCYTE [DISTWIDTH] IN BLOOD BY AUTOMATED COUNT: 14.3 %
EST. AVERAGE GLUCOSE BLD GHB EST-MCNC: 157 MG/DL (ref 68–126)
ETHANOL SERPL-MCNC: <3 MG/DL (ref ?–3)
GFR SERPLBLD BASED ON 1.73 SQ M-ARVRAT: 62 ML/MIN/1.73M2 (ref 60–?)
GLOBULIN PLAS-MCNC: 4.2 G/DL (ref 2.8–4.4)
GLUCOSE BLD-MCNC: 118 MG/DL (ref 70–99)
GLUCOSE BLD-MCNC: 119 MG/DL (ref 70–99)
GLUCOSE BLD-MCNC: 77 MG/DL (ref 70–99)
GLUCOSE BLD-MCNC: 97 MG/DL (ref 70–99)
GLUCOSE BLD-MCNC: 98 MG/DL (ref 70–99)
GLUCOSE UR STRIP.AUTO-MCNC: NEGATIVE MG/DL
HBA1C MFR BLD: 7.1 % (ref ?–5.7)
HCO3 BLDV-SCNC: 25.4 MEQ/L (ref 22–26)
HCT VFR BLD AUTO: 38.9 %
HGB BLD-MCNC: 11.8 G/DL
HYALINE CASTS #/AREA URNS AUTO: PRESENT /LPF
HYALINE CASTS #/AREA URNS AUTO: PRESENT /LPF
IMM GRANULOCYTES # BLD AUTO: 0.03 X10(3) UL (ref 0–1)
IMM GRANULOCYTES NFR BLD: 0.4 %
KETONES UR STRIP.AUTO-MCNC: NEGATIVE MG/DL
LYMPHOCYTES # BLD AUTO: 1.03 X10(3) UL (ref 1–4)
LYMPHOCYTES NFR BLD AUTO: 15.3 %
MCH RBC QN AUTO: 25.4 PG (ref 26–34)
MCHC RBC AUTO-ENTMCNC: 30.3 G/DL (ref 31–37)
MCV RBC AUTO: 83.8 FL
MDMA UR QL SCN: NEGATIVE
MONOCYTES # BLD AUTO: 0.61 X10(3) UL (ref 0.1–1)
MONOCYTES NFR BLD AUTO: 9.1 %
NEUTROPHILS # BLD AUTO: 4.79 X10 (3) UL (ref 1.5–7.7)
NEUTROPHILS # BLD AUTO: 4.79 X10(3) UL (ref 1.5–7.7)
NEUTROPHILS NFR BLD AUTO: 71.1 %
NITRITE UR QL STRIP.AUTO: POSITIVE
OPIATES UR QL SCN: NEGATIVE
OSMOLALITY SERPL CALC.SUM OF ELEC: 294 MOSM/KG (ref 275–295)
OXYCODONE UR QL SCN: NEGATIVE
OXYHGB MFR BLDV: 41.6 % (ref 72–78)
P AXIS: 56 DEGREES
P-R INTERVAL: 216 MS
PCO2 BLDV: 48 MM HG (ref 38–50)
PH BLDV: 7.38 [PH] (ref 7.33–7.43)
PH UR STRIP.AUTO: 6.5 [PH] (ref 5–8)
PLATELET # BLD AUTO: 238 10(3)UL (ref 150–450)
PO2 BLDV: 25 MM HG (ref 30–50)
POTASSIUM SERPL-SCNC: 3.7 MMOL/L (ref 3.5–5.1)
PROT SERPL-MCNC: 7.2 G/DL (ref 6.4–8.2)
Q-T INTERVAL: 440 MS
QRS DURATION: 118 MS
QTC CALCULATION (BEZET): 450 MS
R AXIS: -3 DEGREES
RBC # BLD AUTO: 4.64 X10(6)UL
RBC #/AREA URNS AUTO: >10 /HPF
RBC #/AREA URNS AUTO: >10 /HPF
SARS-COV-2 RNA RESP QL NAA+PROBE: DETECTED
SODIUM SERPL-SCNC: 142 MMOL/L (ref 136–145)
SP GR UR STRIP.AUTO: 1.01 (ref 1–1.03)
T AXIS: 149 DEGREES
UROBILINOGEN UR STRIP.AUTO-MCNC: 0.2 MG/DL
VENTRICULAR RATE: 63 BPM
WBC # BLD AUTO: 6.7 X10(3) UL (ref 4–11)
WBC #/AREA URNS AUTO: >50 /HPF
WBC #/AREA URNS AUTO: >50 /HPF
WBC CLUMPS UR QL AUTO: PRESENT /HPF
WBC CLUMPS UR QL AUTO: PRESENT /HPF

## 2022-12-12 PROCEDURE — 71045 X-RAY EXAM CHEST 1 VIEW: CPT | Performed by: INTERNAL MEDICINE

## 2022-12-12 PROCEDURE — 99223 1ST HOSP IP/OBS HIGH 75: CPT | Performed by: INTERNAL MEDICINE

## 2022-12-12 PROCEDURE — 70450 CT HEAD/BRAIN W/O DYE: CPT | Performed by: EMERGENCY MEDICINE

## 2022-12-12 RX ORDER — ASPIRIN 81 MG/1
81 TABLET ORAL DAILY
Status: DISCONTINUED | OUTPATIENT
Start: 2022-12-12 | End: 2022-12-15

## 2022-12-12 RX ORDER — NICOTINE POLACRILEX 4 MG
30 LOZENGE BUCCAL
Status: DISCONTINUED | OUTPATIENT
Start: 2022-12-12 | End: 2022-12-15

## 2022-12-12 RX ORDER — SODIUM CHLORIDE, SODIUM LACTATE, POTASSIUM CHLORIDE, CALCIUM CHLORIDE 600; 310; 30; 20 MG/100ML; MG/100ML; MG/100ML; MG/100ML
INJECTION, SOLUTION INTRAVENOUS CONTINUOUS
Status: DISCONTINUED | OUTPATIENT
Start: 2022-12-12 | End: 2022-12-15

## 2022-12-12 RX ORDER — ACETAMINOPHEN 500 MG
1000 TABLET ORAL EVERY 8 HOURS PRN
Status: DISCONTINUED | OUTPATIENT
Start: 2022-12-12 | End: 2022-12-15

## 2022-12-12 RX ORDER — NALOXONE HYDROCHLORIDE 1 MG/ML
2 INJECTION INTRAMUSCULAR; INTRAVENOUS; SUBCUTANEOUS ONCE
Status: COMPLETED | OUTPATIENT
Start: 2022-12-12 | End: 2022-12-12

## 2022-12-12 RX ORDER — METOCLOPRAMIDE HYDROCHLORIDE 5 MG/ML
5 INJECTION INTRAMUSCULAR; INTRAVENOUS EVERY 8 HOURS PRN
Status: DISCONTINUED | OUTPATIENT
Start: 2022-12-12 | End: 2022-12-15

## 2022-12-12 RX ORDER — ENOXAPARIN SODIUM 100 MG/ML
40 INJECTION SUBCUTANEOUS NIGHTLY
Status: DISCONTINUED | OUTPATIENT
Start: 2022-12-12 | End: 2022-12-15

## 2022-12-12 RX ORDER — ATORVASTATIN CALCIUM 10 MG/1
10 TABLET, FILM COATED ORAL NIGHTLY
Status: DISCONTINUED | OUTPATIENT
Start: 2022-12-12 | End: 2022-12-15

## 2022-12-12 RX ORDER — SENNOSIDES 8.6 MG
17.2 TABLET ORAL NIGHTLY PRN
Status: DISCONTINUED | OUTPATIENT
Start: 2022-12-12 | End: 2022-12-15

## 2022-12-12 RX ORDER — BISACODYL 10 MG
10 SUPPOSITORY, RECTAL RECTAL
Status: DISCONTINUED | OUTPATIENT
Start: 2022-12-12 | End: 2022-12-15

## 2022-12-12 RX ORDER — DEXTROSE MONOHYDRATE 25 G/50ML
50 INJECTION, SOLUTION INTRAVENOUS
Status: DISCONTINUED | OUTPATIENT
Start: 2022-12-12 | End: 2022-12-15

## 2022-12-12 RX ORDER — ENALAPRIL MALEATE 10 MG/1
20 TABLET ORAL DAILY
Status: DISCONTINUED | OUTPATIENT
Start: 2022-12-12 | End: 2022-12-14 | Stop reason: ALTCHOICE

## 2022-12-12 RX ORDER — MEMANTINE HYDROCHLORIDE 10 MG/1
10 TABLET ORAL 2 TIMES DAILY
Status: DISCONTINUED | OUTPATIENT
Start: 2022-12-12 | End: 2022-12-15

## 2022-12-12 RX ORDER — POLYETHYLENE GLYCOL 3350 17 G/17G
17 POWDER, FOR SOLUTION ORAL DAILY PRN
Status: DISCONTINUED | OUTPATIENT
Start: 2022-12-12 | End: 2022-12-15

## 2022-12-12 RX ORDER — NICOTINE POLACRILEX 4 MG
15 LOZENGE BUCCAL
Status: DISCONTINUED | OUTPATIENT
Start: 2022-12-12 | End: 2022-12-15

## 2022-12-12 RX ORDER — SODIUM PHOSPHATE, DIBASIC AND SODIUM PHOSPHATE, MONOBASIC 7; 19 G/133ML; G/133ML
1 ENEMA RECTAL ONCE AS NEEDED
Status: DISCONTINUED | OUTPATIENT
Start: 2022-12-12 | End: 2022-12-15

## 2022-12-12 RX ORDER — ESCITALOPRAM OXALATE 10 MG/1
10 TABLET ORAL DAILY
Status: DISCONTINUED | OUTPATIENT
Start: 2022-12-12 | End: 2022-12-14

## 2022-12-12 RX ORDER — MELATONIN
3 NIGHTLY PRN
Status: DISCONTINUED | OUTPATIENT
Start: 2022-12-12 | End: 2022-12-15

## 2022-12-12 RX ORDER — LEVOFLOXACIN 5 MG/ML
750 INJECTION, SOLUTION INTRAVENOUS ONCE
Status: DISCONTINUED | OUTPATIENT
Start: 2022-12-12 | End: 2022-12-12

## 2022-12-12 RX ORDER — ONDANSETRON 2 MG/ML
4 INJECTION INTRAMUSCULAR; INTRAVENOUS EVERY 6 HOURS PRN
Status: DISCONTINUED | OUTPATIENT
Start: 2022-12-12 | End: 2022-12-15

## 2022-12-12 NOTE — ED QUICK NOTES
Orders for admission, patient is aware of plan and ready to go upstairs. Any questions, please call ED RN Niurka at extension [phone].      Patient Covid vaccination status: Unvaccinated     COVID Test Ordered in ED: Rapid SARS-CoV-2 by PCR    COVID Suspicion at Admission: N/A    Running Infusions:      Mental Status/LOC at time of transport: A/Ox1, Baseline verbal, nonsensical. Today, nonverbal.      Other pertinent information:   CIWA score: N/A   NIH score:  N/A

## 2022-12-12 NOTE — ED INITIAL ASSESSMENT (HPI)
Patient LKW 5:30pm. Normal BG at 0700. Per nursing home patient was very weak around 0930, and was hypoglycemic in the 50s. 200cc D10 given PTA at 1040. Covid (+) 5 days ago.      on arrival

## 2022-12-13 LAB
ANION GAP SERPL CALC-SCNC: 12 MMOL/L (ref 0–18)
BASOPHILS # BLD AUTO: 0.05 X10(3) UL (ref 0–0.2)
BASOPHILS NFR BLD AUTO: 0.9 %
BUN BLD-MCNC: 8 MG/DL (ref 7–18)
CALCIUM BLD-MCNC: 8.8 MG/DL (ref 8.5–10.1)
CHLORIDE SERPL-SCNC: 109 MMOL/L (ref 98–112)
CO2 SERPL-SCNC: 20 MMOL/L (ref 21–32)
CREAT BLD-MCNC: 0.65 MG/DL
EOSINOPHIL # BLD AUTO: 0.14 X10(3) UL (ref 0–0.7)
EOSINOPHIL NFR BLD AUTO: 2.4 %
ERYTHROCYTE [DISTWIDTH] IN BLOOD BY AUTOMATED COUNT: 14.3 %
GFR SERPLBLD BASED ON 1.73 SQ M-ARVRAT: 82 ML/MIN/1.73M2 (ref 60–?)
GLUCOSE BLD-MCNC: 106 MG/DL (ref 70–99)
GLUCOSE BLD-MCNC: 78 MG/DL (ref 70–99)
GLUCOSE BLD-MCNC: 85 MG/DL (ref 70–99)
GLUCOSE BLD-MCNC: 95 MG/DL (ref 70–99)
GLUCOSE BLD-MCNC: 95 MG/DL (ref 70–99)
GLUCOSE BLD-MCNC: 99 MG/DL (ref 70–99)
HCT VFR BLD AUTO: 38.5 %
HGB BLD-MCNC: 11.8 G/DL
IMM GRANULOCYTES # BLD AUTO: 0.03 X10(3) UL (ref 0–1)
IMM GRANULOCYTES NFR BLD: 0.5 %
LYMPHOCYTES # BLD AUTO: 1.6 X10(3) UL (ref 1–4)
LYMPHOCYTES NFR BLD AUTO: 27.8 %
MCH RBC QN AUTO: 25.7 PG (ref 26–34)
MCHC RBC AUTO-ENTMCNC: 30.6 G/DL (ref 31–37)
MCV RBC AUTO: 83.9 FL
MONOCYTES # BLD AUTO: 0.58 X10(3) UL (ref 0.1–1)
MONOCYTES NFR BLD AUTO: 10.1 %
NEUTROPHILS # BLD AUTO: 3.36 X10 (3) UL (ref 1.5–7.7)
NEUTROPHILS # BLD AUTO: 3.36 X10(3) UL (ref 1.5–7.7)
NEUTROPHILS NFR BLD AUTO: 58.3 %
OSMOLALITY SERPL CALC.SUM OF ELEC: 290 MOSM/KG (ref 275–295)
PLATELET # BLD AUTO: 165 10(3)UL (ref 150–450)
POTASSIUM SERPL-SCNC: 4 MMOL/L (ref 3.5–5.1)
RBC # BLD AUTO: 4.59 X10(6)UL
SODIUM SERPL-SCNC: 141 MMOL/L (ref 136–145)
WBC # BLD AUTO: 5.8 X10(3) UL (ref 4–11)

## 2022-12-13 PROCEDURE — 99232 SBSQ HOSP IP/OBS MODERATE 35: CPT | Performed by: HOSPITALIST

## 2022-12-13 NOTE — PROGRESS NOTES
Pt arrived from ER, very lethargic, pt with hx dementia. +covid from 4 days ago. Pt was dipping into the high 80s, pt was put on 02 2 L NC. Son at bedside. IV LR at 100 ml started. Meds will be held for now due to lethargy. +UTI. Rocephin IV. Lovenox. Pts son stated she was ambulating last week with PT with her walker but doesn't walk much. He stated she fell 10 days ago, but didn't appear to have any injuries. Pts son stated she wears dentures but they are at [facility].  Will monitor

## 2022-12-13 NOTE — SLP NOTE
SLP order received to evaluate oropharyngeal swallow. Patient lethargic and unable to sustain level of alertness appropriate for evaluation at time of my visit. Patient unarousable despite noxious stimuli. SLP will continue to follow and evaluate as appropriate.

## 2022-12-13 NOTE — CM/SW NOTE
Department  notified of request for karel [facility]ES referrals started. Assigned CM/SW to follow up with pt/family on further discharge planning.      [facility]

## 2022-12-13 NOTE — PROGRESS NOTES
12/12/22 8822   Provider Notification   Reason for Communication Review case   Provider Name   (Dr. Dia Lerner)   Method of Communication Page   Response Waiting for response   Pt's recent BP was 164/56. HR: 64. I held metoprolol because pt is very lethargic and sleeping. Do you want to give IV vs PO? Thanks    26- Per MD, no new orders at this time.

## 2022-12-13 NOTE — CM/SW NOTE
Pt admitted for AMS. Pt is from [facility] (not sure which one). Pt has a history of [facility] at [facility]. Left message for pt's son Mario Ramon to complete assessment - waiting for a call back.

## 2022-12-13 NOTE — CM/SW NOTE
PT saw pt and they are recommending [facility]. Spoke with pt's son Aleksander Pinzon who is agreeable to having pt go to [facility]. He says if [facility] is available he would like her to go there. Referrals to be sent for [facility] by [facility] - waiting for responses. PASRR completed. SW to f/u with accepting SARs and son's [facility] choice.

## 2022-12-13 NOTE — PLAN OF CARE
Pt alert and oriented to self, very lethargic. PO meds are not given d/t pt's mentation. Currently on RA. Tele, SR/AV 1st degree block. Purewick in place. Qid accucheck, carb controlled. IV rocephin. LR 100mL/hr. Mepilex on both heels. Pt's son updated on poc. Call light in reach. Safety precautions in place. All needs are met at this time.     Problem: Diabetes/Glucose Control  Goal: Glucose maintained within prescribed range  Description: INTERVENTIONS:  - Monitor Blood Glucose as ordered  - Assess for signs and symptoms of hyperglycemia and hypoglycemia  - Administer ordered medications to maintain glucose within target range  - Assess barriers to adequate nutritional intake and initiate nutrition consult as needed  - Instruct patient on self management of diabetes  Outcome: Progressing     Problem: Patient/Family Goals  Goal: Patient/Family Long Term Goal  Description: Patient's Long Term Goal: To discharge with adequate resources    Interventions:  - Comply with plan of care  - See additional Care Plan goals for specific interventions  Outcome: Progressing  Goal: Patient/Family Short Term Goal  Description: Patient's Short Term Goal:   12/12 NOC: Sleep   12/13: stay more alert, improve appetite    Interventions:   -Cluster care, dim lights  - See additional Care Plan goals for specific interventions  Outcome: Progressing

## 2022-12-13 NOTE — PLAN OF CARE
Pt A&Oxself. Hx dementia. Lethargic. Received on 2L. Able to wean down to RA. Sating WNL. On tele. NSR. No signs of pain at this time. Lovenox. Held PO medication d/t lethargy. QID accuchecks. IVF. Poor appetite. Incontinent. Briefed. Q2 turns. Redness to heels. Mepilex applied. Up x1 walker baseline. Pt updated on plan of care. Call light within reach. All needs met at this time. Safety precautions in place.      Problem: Diabetes/Glucose Control  Goal: Glucose maintained within prescribed range  Description: INTERVENTIONS:  - Monitor Blood Glucose as ordered  - Assess for signs and symptoms of hyperglycemia and hypoglycemia  - Administer ordered medications to maintain glucose within target range  - Assess barriers to adequate nutritional intake and initiate nutrition consult as needed  - Instruct patient on self management of diabetes  Outcome: Progressing     Problem: Patient/Family Goals  Goal: Patient/Family Long Term Goal  Description: Patient's Long Term Goal: To discharge with adequate resources    Interventions:  - Comply with plan of care  - See additional Care Plan goals for specific interventions  Outcome: Progressing  Goal: Patient/Family Short Term Goal  Description: Patient's Short Term Goal:   12/12 NOC: Sleep     Interventions:   -Cluster care, dim lights  - See additional Care Plan goals for specific interventions  Outcome: Progressing

## 2022-12-13 NOTE — CM/SW NOTE
Pt is a 79 yo female admitted for AMS. Pt is +Covid on 12/12. Pt came from [facility] where she has lived for the past month. He says pt had started working with PT at [facility]. Pt has a history of [facility] at [facility] and a place called [facility] in [place] when she was staying with her dtr who lives there. PT/OT to see. SW to f/u with pt's dc needs pending PT/OT's recommendations.      12/13/22 1400   CM/SW Referral Data   Referral Source Physician   Reason for Referral Discharge planning   Informant Son   Patient [facility] Acute Care Provider Upon Admission [facility] No   Discharge Needs   Anticipated D/C needs To be determined

## 2022-12-14 PROBLEM — F03.90 DEMENTIA (HCC): Status: ACTIVE | Noted: 2022-07-31

## 2022-12-14 PROBLEM — F03.90 DEMENTIA (HCC): Status: ACTIVE | Noted: 2022-01-01

## 2022-12-14 LAB
ANION GAP SERPL CALC-SCNC: 10 MMOL/L (ref 0–18)
BASOPHILS # BLD AUTO: 0.03 X10(3) UL (ref 0–0.2)
BASOPHILS NFR BLD AUTO: 0.4 %
BUN BLD-MCNC: 8 MG/DL (ref 7–18)
CALCIUM BLD-MCNC: 9 MG/DL (ref 8.5–10.1)
CHLORIDE SERPL-SCNC: 107 MMOL/L (ref 98–112)
CO2 SERPL-SCNC: 22 MMOL/L (ref 21–32)
CREAT BLD-MCNC: 0.64 MG/DL
EOSINOPHIL # BLD AUTO: 0.16 X10(3) UL (ref 0–0.7)
EOSINOPHIL NFR BLD AUTO: 2.3 %
ERYTHROCYTE [DISTWIDTH] IN BLOOD BY AUTOMATED COUNT: 13.8 %
GFR SERPLBLD BASED ON 1.73 SQ M-ARVRAT: 82 ML/MIN/1.73M2 (ref 60–?)
GLUCOSE BLD-MCNC: 100 MG/DL (ref 70–99)
GLUCOSE BLD-MCNC: 129 MG/DL (ref 70–99)
GLUCOSE BLD-MCNC: 132 MG/DL (ref 70–99)
GLUCOSE BLD-MCNC: 201 MG/DL (ref 70–99)
GLUCOSE BLD-MCNC: 92 MG/DL (ref 70–99)
HCT VFR BLD AUTO: 38.8 %
HGB BLD-MCNC: 12.3 G/DL
IMM GRANULOCYTES # BLD AUTO: 0.05 X10(3) UL (ref 0–1)
IMM GRANULOCYTES NFR BLD: 0.7 %
LYMPHOCYTES # BLD AUTO: 1.81 X10(3) UL (ref 1–4)
LYMPHOCYTES NFR BLD AUTO: 25.7 %
MAGNESIUM SERPL-MCNC: 1.5 MG/DL (ref 1.6–2.6)
MCH RBC QN AUTO: 26.3 PG (ref 26–34)
MCHC RBC AUTO-ENTMCNC: 31.7 G/DL (ref 31–37)
MCV RBC AUTO: 83.1 FL
MONOCYTES # BLD AUTO: 0.76 X10(3) UL (ref 0.1–1)
MONOCYTES NFR BLD AUTO: 10.8 %
NEUTROPHILS # BLD AUTO: 4.24 X10 (3) UL (ref 1.5–7.7)
NEUTROPHILS # BLD AUTO: 4.24 X10(3) UL (ref 1.5–7.7)
NEUTROPHILS NFR BLD AUTO: 60.1 %
OSMOLALITY SERPL CALC.SUM OF ELEC: 286 MOSM/KG (ref 275–295)
PLATELET # BLD AUTO: 192 10(3)UL (ref 150–450)
POTASSIUM SERPL-SCNC: 4 MMOL/L (ref 3.5–5.1)
RBC # BLD AUTO: 4.67 X10(6)UL
SODIUM SERPL-SCNC: 139 MMOL/L (ref 136–145)
WBC # BLD AUTO: 7.1 X10(3) UL (ref 4–11)

## 2022-12-14 PROCEDURE — 99232 SBSQ HOSP IP/OBS MODERATE 35: CPT | Performed by: INTERNAL MEDICINE

## 2022-12-14 RX ORDER — MIRTAZAPINE 15 MG/1
15 TABLET, FILM COATED ORAL NIGHTLY
Status: DISCONTINUED | OUTPATIENT
Start: 2022-12-14 | End: 2022-12-15

## 2022-12-14 RX ORDER — AMOXICILLIN 250 MG
2 CAPSULE ORAL EVERY 12 HOURS PRN
COMMUNITY

## 2022-12-14 RX ORDER — INSULIN ASPART 100 [IU]/ML
3 INJECTION, SOLUTION INTRAVENOUS; SUBCUTANEOUS
COMMUNITY

## 2022-12-14 RX ORDER — TAMSULOSIN HYDROCHLORIDE 0.4 MG/1
0.4 CAPSULE ORAL
Status: DISCONTINUED | OUTPATIENT
Start: 2022-12-14 | End: 2022-12-15

## 2022-12-14 RX ORDER — TAMSULOSIN HYDROCHLORIDE 0.4 MG/1
0.4 CAPSULE ORAL
COMMUNITY
Start: 2022-11-21

## 2022-12-14 RX ORDER — HYDRALAZINE HYDROCHLORIDE 20 MG/ML
5 INJECTION INTRAMUSCULAR; INTRAVENOUS EVERY 6 HOURS PRN
Status: DISCONTINUED | OUTPATIENT
Start: 2022-12-14 | End: 2022-12-15

## 2022-12-14 RX ORDER — MIRTAZAPINE 15 MG/1
15 TABLET, FILM COATED ORAL NIGHTLY
COMMUNITY
Start: 2022-11-11

## 2022-12-14 RX ORDER — DONEPEZIL HYDROCHLORIDE 5 MG/1
5 TABLET, FILM COATED ORAL NIGHTLY
COMMUNITY

## 2022-12-14 RX ORDER — HYDROCORTISONE ACETATE 25 MG/1
25 SUPPOSITORY RECTAL 2 TIMES DAILY PRN
COMMUNITY

## 2022-12-14 RX ORDER — POLYETHYLENE GLYCOL 3350 17 G/17G
17 POWDER, FOR SOLUTION ORAL DAILY PRN
COMMUNITY

## 2022-12-14 RX ORDER — INSULIN GLARGINE 100 [IU]/ML
10 INJECTION, SOLUTION SUBCUTANEOUS 2 TIMES DAILY
COMMUNITY
Start: 2022-11-11

## 2022-12-14 RX ORDER — DONEPEZIL HYDROCHLORIDE 5 MG/1
5 TABLET, FILM COATED ORAL NIGHTLY
Status: DISCONTINUED | OUTPATIENT
Start: 2022-12-14 | End: 2022-12-15

## 2022-12-14 RX ORDER — MAGNESIUM SULFATE HEPTAHYDRATE 40 MG/ML
2 INJECTION, SOLUTION INTRAVENOUS ONCE
Status: COMPLETED | OUTPATIENT
Start: 2022-12-14 | End: 2022-12-14

## 2022-12-14 RX ORDER — ACETAMINOPHEN 325 MG/1
650 TABLET ORAL EVERY 6 HOURS PRN
COMMUNITY

## 2022-12-14 RX ORDER — LINAGLIPTIN 5 MG/1
5 TABLET, FILM COATED ORAL
COMMUNITY
Start: 2022-11-11 | End: 2022-12-15

## 2022-12-14 RX ORDER — ACETAMINOPHEN 325 MG/1
650 TABLET ORAL EVERY 6 HOURS PRN
Status: DISCONTINUED | OUTPATIENT
Start: 2022-12-14 | End: 2022-12-15

## 2022-12-14 RX ORDER — SODIUM ZIRCONIUM CYCLOSILICATE 10 G/10G
10 POWDER, FOR SUSPENSION ORAL EVERY MORNING
COMMUNITY

## 2022-12-14 NOTE — PROGRESS NOTES
12/14/22 0607   Provider Notification   Reason for Communication Evaluate   Provider Name   (Dr. Hua Pacheco)   Method of Communication Page   Response Waiting for response   Pt has elevated BP. Current BP is 165/63. No PRN orders. Any new orders? Thanks. 0142- See new orders.

## 2022-12-14 NOTE — PLAN OF CARE
Pt A&Oxself. Hx dementia. Lethargic. RA-2L NC PRN sating WNL. On tele. NSR. No signs of pain at this time. Lovenox. Held PO medication d/t lethargy. QID accuchecks. IVF. Poor appetite. Incontinent. Purewick and briefed. BR. Q2 turns. Redness to heels. Melipex in place. Per baseline up x1 walker. Pt updated on plan of care. Call light within reach. All needs met at this time. Safety precautions in place.      Problem: Diabetes/Glucose Control  Goal: Glucose maintained within prescribed range  Description: INTERVENTIONS:  - Monitor Blood Glucose as ordered  - Assess for signs and symptoms of hyperglycemia and hypoglycemia  - Administer ordered medications to maintain glucose within target range  - Assess barriers to adequate nutritional intake and initiate nutrition consult as needed  - Instruct patient on self management of diabetes  Outcome: Progressing     Problem: Patient/Family Goals  Goal: Patient/Family Long Term Goal  Description: Patient's Long Term Goal: To discharge with adequate resources    Interventions:  - Comply with plan of care  - See additional Care Plan goals for specific interventions  Outcome: Progressing  Goal: Patient/Family Short Term Goal  Description: Patient's Short Term Goal:   12/12 NOC: Sleep   12/13: stay more alert, improve appetite  12/13 NOC: Be less lethargic    Interventions:   -Cluster care, dim lights  - See additional Care Plan goals for specific interventions  Outcome: Progressing

## 2022-12-14 NOTE — CM/SW NOTE
SW spoke with pt's son regarding accepting [facility] choice list. SHREYAS emailed list of accepting [facility] choices to pt's son at [email or web address]. Pt's son will review the [facility] choices and reach out to SW with selected [facility] facility. SW will f/u with pt's son regarding [facility] choice. DHARMESH Boswell  Discharge Planner     Addendum: SHREYAS received call back from pt's son stating he has reviewed the [facility] facilities and has selected [facility]. [facility] reserved in [place]. Await medical clearance for further discharge planning. SHREYAS will continue to follow.

## 2022-12-15 VITALS
OXYGEN SATURATION: 95 % | DIASTOLIC BLOOD PRESSURE: 57 MMHG | RESPIRATION RATE: 18 BRPM | SYSTOLIC BLOOD PRESSURE: 154 MMHG | HEART RATE: 67 BPM | TEMPERATURE: 98 F | BODY MASS INDEX: 26 KG/M2 | WEIGHT: 154 LBS

## 2022-12-15 LAB
ANION GAP SERPL CALC-SCNC: 7 MMOL/L (ref 0–18)
ATRIAL RATE: 62 BPM
BASOPHILS # BLD AUTO: 0.03 X10(3) UL (ref 0–0.2)
BASOPHILS NFR BLD AUTO: 0.3 %
BUN BLD-MCNC: 7 MG/DL (ref 7–18)
CALCIUM BLD-MCNC: 9.1 MG/DL (ref 8.5–10.1)
CHLORIDE SERPL-SCNC: 107 MMOL/L (ref 98–112)
CO2 SERPL-SCNC: 23 MMOL/L (ref 21–32)
CREAT BLD-MCNC: 0.58 MG/DL
EOSINOPHIL # BLD AUTO: 0.13 X10(3) UL (ref 0–0.7)
EOSINOPHIL NFR BLD AUTO: 1.4 %
ERYTHROCYTE [DISTWIDTH] IN BLOOD BY AUTOMATED COUNT: 13.6 %
GFR SERPLBLD BASED ON 1.73 SQ M-ARVRAT: 84 ML/MIN/1.73M2 (ref 60–?)
GLUCOSE BLD-MCNC: 139 MG/DL (ref 70–99)
GLUCOSE BLD-MCNC: 147 MG/DL (ref 70–99)
GLUCOSE BLD-MCNC: 160 MG/DL (ref 70–99)
GLUCOSE BLD-MCNC: 162 MG/DL (ref 70–99)
HCT VFR BLD AUTO: 40.5 %
HGB BLD-MCNC: 12.4 G/DL
IMM GRANULOCYTES # BLD AUTO: 0.07 X10(3) UL (ref 0–1)
IMM GRANULOCYTES NFR BLD: 0.8 %
LYMPHOCYTES # BLD AUTO: 1.6 X10(3) UL (ref 1–4)
LYMPHOCYTES NFR BLD AUTO: 17.7 %
MAGNESIUM SERPL-MCNC: 1.8 MG/DL (ref 1.6–2.6)
MCH RBC QN AUTO: 25.8 PG (ref 26–34)
MCHC RBC AUTO-ENTMCNC: 30.6 G/DL (ref 31–37)
MCV RBC AUTO: 84.4 FL
MONOCYTES # BLD AUTO: 1.16 X10(3) UL (ref 0.1–1)
MONOCYTES NFR BLD AUTO: 12.8 %
NEUTROPHILS # BLD AUTO: 6.07 X10 (3) UL (ref 1.5–7.7)
NEUTROPHILS # BLD AUTO: 6.07 X10(3) UL (ref 1.5–7.7)
NEUTROPHILS NFR BLD AUTO: 67 %
OSMOLALITY SERPL CALC.SUM OF ELEC: 285 MOSM/KG (ref 275–295)
P AXIS: 52 DEGREES
P-R INTERVAL: 220 MS
PHOSPHATE SERPL-MCNC: 3 MG/DL (ref 2.5–4.9)
PLATELET # BLD AUTO: 209 10(3)UL (ref 150–450)
POTASSIUM SERPL-SCNC: 4.6 MMOL/L (ref 3.5–5.1)
Q-T INTERVAL: 480 MS
QRS DURATION: 120 MS
QTC CALCULATION (BEZET): 487 MS
R AXIS: 0 DEGREES
RBC # BLD AUTO: 4.8 X10(6)UL
SODIUM SERPL-SCNC: 137 MMOL/L (ref 136–145)
T AXIS: 140 DEGREES
VENTRICULAR RATE: 62 BPM
WBC # BLD AUTO: 9.1 X10(3) UL (ref 4–11)

## 2022-12-15 PROCEDURE — 99239 HOSP IP/OBS DSCHRG MGMT >30: CPT | Performed by: INTERNAL MEDICINE

## 2022-12-15 RX ORDER — CIPROFLOXACIN 500 MG/1
500 TABLET, FILM COATED ORAL 2 TIMES DAILY
Qty: 6 TABLET | Refills: 0 | Status: SHIPPED | OUTPATIENT
Start: 2022-12-15 | End: 2022-12-18

## 2022-12-15 NOTE — PROGRESS NOTES
12/15/22 0434   Provider Notification   Reason for Communication Evaluate   Provider Name   (Dr. Jarad Nsah)   Method of Communication Page   Response Waiting for response   FYI- Per tele, pt is having frequent bigeminy. Pt does not seem to be in any distress. A&Oxself. BP: 147/75. HR 58. Thanks.

## 2022-12-15 NOTE — CM/SW NOTE
12/15/22 1635   Discharge disposition   Expected discharge disposition [id number] Provider   ([facility])   Discharge transportation [address]     Notified by RN pt is medically cleared to discharge. SHREYAS spoke with Jt Zhang at [facility] who confirmed they are able to accept pt today, but stated pt has 15 Medicare days remaining. SW updated pt's son and informed him to consider possible respite or LTC if pt exhausts her Medicare days and cannot return to her long term. [facility] arranged for 7pm. Updated RN, Jt Zhang at [facility], and pt's son who is agreeable with discharge plan. PCS form completed and available for RN to print.       [facility]  [phone]    [facility]  [phone]    [facility]  Discharge Planner

## 2022-12-15 NOTE — PLAN OF CARE
Pt A&Oxself. Hx dementia. Lethargic but arousable to stimuli. On 2L sating WNL. Will wean as tolerated. RA baseline. . On tele. SB/NSR. 50-60s. No signs of pain at this time. Lovenox. QID accuchecks. IVF. Incontinent. Purewick and briefed. BR. Q2 turns. Left heel wound. Melipex in place. Redness to right heel. Bunny boots. Per baseline up x1 with walker. Pt updated on plan of care. Call light within reach. All needs met at this time. Safety precautions in place.      Problem: Diabetes/Glucose Control  Goal: Glucose maintained within prescribed range  Description: INTERVENTIONS:  - Monitor Blood Glucose as ordered  - Assess for signs and symptoms of hyperglycemia and hypoglycemia  - Administer ordered medications to maintain glucose within target range  - Assess barriers to adequate nutritional intake and initiate nutrition consult as needed  - Instruct patient on self management of diabetes  Outcome: Progressing     Problem: Patient/Family Goals  Goal: Patient/Family Long Term Goal  Description: Patient's Long Term Goal: To discharge with adequate resources    Interventions:  - Comply with plan of care  - See additional Care Plan goals for specific interventions  Outcome: Progressing  Goal: Patient/Family Short Term Goal  Description: Patient's Short Term Goal:   12/12 NOC: Sleep   12/13: stay more alert, improve appetite  12/13 NOC: Be less lethargic  12/14: stay awake, tolerate diet  12/14 NOC: Stay more awake    Interventions:   -Cluster care, dim lights  - See additional Care Plan goals for specific interventions  Outcome: Progressing

## 2022-12-15 NOTE — DISCHARGE INSTRUCTIONS
Speech Therapy recommendations for swallow precautions:  Diet Recommendations - Solids: Puree  Diet Recommendations - Liquids: Thin Liquids (small, single controlled sips/no straws/only when alert & participatory  Compensatory Strategies Recommended: Slow rate; Alternate consistencies;Small bites and sips; Extra sauce/gravy  Aspiration Precautions: Upright position; Slow rate;Small bites and sips; No straw  Medication Administration Recommendations: One pill at a time (whole or crushed in puree if any difficulty)  Treatment Plan/Recommendations: Dysphagia therapy; Aspiration precautions

## 2022-12-16 NOTE — PROGRESS NOTES
1855: First attempt to give nursing report to [facility]. No answer from RN. 1900: Second attempt. No answer from RN. 1903: Third attempt. No answer from RN. Provided the  from [address] [place] [address] with this RN contact for call back.      1907: Ambulance picked up patient for transport

## 2022-12-16 NOTE — PLAN OF CARE
Problem: Diabetes/Glucose Control  Goal: Glucose maintained within prescribed range  Description: INTERVENTIONS:  - Monitor Blood Glucose as ordered  - Assess for signs and symptoms of hyperglycemia and hypoglycemia  - Administer ordered medications to maintain glucose within target range  - Assess barriers to adequate nutritional intake and initiate nutrition consult as needed  - Instruct patient on self management of diabetes  Outcome: Completed     Problem: Patient/Family Goals  Goal: Patient/Family Long Term Goal  Description: Patient's Long Term Goal: To discharge with adequate resources    Interventions:  - Comply with plan of care  - See additional Care Plan goals for specific interventions  Outcome: Completed  Goal: Patient/Family Short Term Goal  Description: Patient's Short Term Goal:   12/12 NOC: Sleep   12/13: stay more alert, improve appetite  12/13 NOC: Be less lethargic  12/14: stay awake, tolerate diet  12/14 NOC: Stay more awake    Interventions:   -Cluster care, dim lights  - See additional Care Plan goals for specific interventions  Outcome: Completed

## 2022-12-16 NOTE — PLAN OF CARE
NURSING DISCHARGE NOTE    Discharged Nursing home via Ambulance. Accompanied by Support staff  Belongings Taken by patient/family. Last set of VS stable- See flowsheets. Pt stable to discharge to [facility] for subacute rehab. Medically cleared by hospitalist. Patient education inappropriate at this time as patient is confused. Report given to ___ , RN adonay Ramos. IV lines dc'ed, pressure and dressing applied. C/d/I. Discharge navigator completed. Printed prescriptions included in discharge paperwork. Pt's belongings sent with pt.      Problem: Diabetes/Glucose Control  Goal: Glucose maintained within prescribed range  Description: INTERVENTIONS:  - Monitor Blood Glucose as ordered  - Assess for signs and symptoms of hyperglycemia and hypoglycemia  - Administer ordered medications to maintain glucose within target range  - Assess barriers to adequate nutritional intake and initiate nutrition consult as needed  - Instruct patient on self management of diabetes  Outcome: Completed     Problem: Patient/Family Goals  Goal: Patient/Family Long Term Goal  Description: Patient's Long Term Goal: To discharge with adequate resources    Interventions:  - Comply with plan of care  - See additional Care Plan goals for specific interventions  Outcome: Completed  Goal: Patient/Family Short Term Goal  Description: Patient's Short Term Goal:   12/12 NOC: Sleep   12/13: stay more alert, improve appetite  12/13 NOC: Be less lethargic  12/14: stay awake, tolerate diet  12/14 NOC: Stay more awake    Interventions:   -Cluster care, dim lights  - See additional Care Plan goals for specific interventions  Outcome: Completed

## 2023-01-01 ENCOUNTER — APPOINTMENT (OUTPATIENT)
Dept: CT IMAGING | Facility: HOSPITAL | Age: 88
End: 2023-01-01
Attending: EMERGENCY MEDICINE
Payer: MEDICARE

## 2023-01-01 ENCOUNTER — APPOINTMENT (OUTPATIENT)
Dept: GENERAL RADIOLOGY | Facility: HOSPITAL | Age: 88
End: 2023-01-01
Attending: EMERGENCY MEDICINE
Payer: OTHER MISCELLANEOUS

## 2023-01-01 ENCOUNTER — APPOINTMENT (OUTPATIENT)
Dept: CV DIAGNOSTICS | Facility: HOSPITAL | Age: 88
End: 2023-01-01
Attending: INTERNAL MEDICINE
Payer: MEDICARE

## 2023-01-01 ENCOUNTER — HOSPITAL ENCOUNTER (INPATIENT)
Facility: HOSPITAL | Age: 88
LOS: 2 days | End: 2023-01-01
Attending: INTERNAL MEDICINE | Admitting: INTERNAL MEDICINE
Payer: OTHER MISCELLANEOUS

## 2023-01-01 ENCOUNTER — APPOINTMENT (OUTPATIENT)
Dept: GENERAL RADIOLOGY | Facility: HOSPITAL | Age: 88
End: 2023-01-01
Attending: EMERGENCY MEDICINE
Payer: MEDICARE

## 2023-01-01 ENCOUNTER — APPOINTMENT (OUTPATIENT)
Dept: MRI IMAGING | Facility: HOSPITAL | Age: 88
End: 2023-01-01
Attending: Other
Payer: MEDICARE

## 2023-01-01 ENCOUNTER — APPOINTMENT (OUTPATIENT)
Dept: ULTRASOUND IMAGING | Facility: HOSPITAL | Age: 88
End: 2023-01-01
Attending: Other
Payer: MEDICARE

## 2023-01-01 ENCOUNTER — HOSPITAL ENCOUNTER (OUTPATIENT)
Facility: HOSPITAL | Age: 88
Setting detail: OBSERVATION
Discharge: HOSPICE | End: 2023-01-01
Attending: EMERGENCY MEDICINE | Admitting: HOSPITALIST
Payer: OTHER MISCELLANEOUS

## 2023-01-01 ENCOUNTER — HOSPITAL ENCOUNTER (INPATIENT)
Facility: HOSPITAL | Age: 88
LOS: 5 days | Discharge: HOSPICE | End: 2023-01-01
Attending: EMERGENCY MEDICINE | Admitting: INTERNAL MEDICINE
Payer: MEDICARE

## 2023-01-01 VITALS
OXYGEN SATURATION: 90 % | TEMPERATURE: 98 F | HEIGHT: 64 IN | DIASTOLIC BLOOD PRESSURE: 63 MMHG | BODY MASS INDEX: 25.6 KG/M2 | HEART RATE: 64 BPM | WEIGHT: 149.94 LBS | SYSTOLIC BLOOD PRESSURE: 129 MMHG | RESPIRATION RATE: 18 BRPM

## 2023-01-01 VITALS
HEART RATE: 77 BPM | TEMPERATURE: 97 F | DIASTOLIC BLOOD PRESSURE: 63 MMHG | WEIGHT: 150 LBS | BODY MASS INDEX: 25 KG/M2 | RESPIRATION RATE: 15 BRPM | SYSTOLIC BLOOD PRESSURE: 140 MMHG | OXYGEN SATURATION: 98 %

## 2023-01-01 VITALS
HEART RATE: 104 BPM | SYSTOLIC BLOOD PRESSURE: 68 MMHG | TEMPERATURE: 98 F | OXYGEN SATURATION: 63 % | RESPIRATION RATE: 22 BRPM | DIASTOLIC BLOOD PRESSURE: 38 MMHG

## 2023-01-01 DIAGNOSIS — R62.7 FAILURE TO THRIVE IN ADULT: ICD-10-CM

## 2023-01-01 DIAGNOSIS — I63.9 CEREBROVASCULAR ACCIDENT (CVA), UNSPECIFIED MECHANISM (HCC): ICD-10-CM

## 2023-01-01 DIAGNOSIS — R56.9 SEIZURE (HCC): Primary | ICD-10-CM

## 2023-01-01 DIAGNOSIS — F03.C0 SEVERE DEMENTIA WITHOUT BEHAVIORAL DISTURBANCE, PSYCHOTIC DISTURBANCE, MOOD DISTURBANCE, OR ANXIETY, UNSPECIFIED DEMENTIA TYPE (HCC): ICD-10-CM

## 2023-01-01 DIAGNOSIS — R79.89 AZOTEMIA: ICD-10-CM

## 2023-01-01 DIAGNOSIS — E86.0 DEHYDRATION: Primary | ICD-10-CM

## 2023-01-01 DIAGNOSIS — E87.0 HYPERNATREMIA: ICD-10-CM

## 2023-01-01 LAB
ALBUMIN SERPL-MCNC: 2.1 G/DL (ref 3.4–5)
ALBUMIN SERPL-MCNC: 2.3 G/DL (ref 3.4–5)
ALBUMIN/GLOB SERPL: 0.5 {RATIO} (ref 1–2)
ALBUMIN/GLOB SERPL: 0.5 {RATIO} (ref 1–2)
ALP LIVER SERPL-CCNC: 89 U/L
ALP LIVER SERPL-CCNC: 94 U/L
ALT SERPL-CCNC: 12 U/L
ALT SERPL-CCNC: 15 U/L
ANION GAP SERPL CALC-SCNC: 3 MMOL/L (ref 0–18)
ANION GAP SERPL CALC-SCNC: 4 MMOL/L (ref 0–18)
ANION GAP SERPL CALC-SCNC: 6 MMOL/L (ref 0–18)
ANION GAP SERPL CALC-SCNC: 6 MMOL/L (ref 0–18)
ANION GAP SERPL CALC-SCNC: 7 MMOL/L (ref 0–18)
AST SERPL-CCNC: 14 U/L (ref 15–37)
AST SERPL-CCNC: 15 U/L (ref 15–37)
ATRIAL RATE: 100 BPM
ATRIAL RATE: 89 BPM
ATRIAL RATE: 97 BPM
BASOPHILS # BLD AUTO: 0.01 X10(3) UL (ref 0–0.2)
BASOPHILS # BLD AUTO: 0.04 X10(3) UL (ref 0–0.2)
BASOPHILS # BLD AUTO: 0.05 X10(3) UL (ref 0–0.2)
BASOPHILS # BLD AUTO: 0.05 X10(3) UL (ref 0–0.2)
BASOPHILS NFR BLD AUTO: 0.1 %
BASOPHILS NFR BLD AUTO: 0.4 %
BASOPHILS NFR BLD AUTO: 0.7 %
BASOPHILS NFR BLD AUTO: 0.7 %
BILIRUB SERPL-MCNC: 0.5 MG/DL (ref 0.1–2)
BILIRUB SERPL-MCNC: 0.7 MG/DL (ref 0.1–2)
BILIRUB UR QL STRIP.AUTO: NEGATIVE
BILIRUB UR QL STRIP.AUTO: NEGATIVE
BUN BLD-MCNC: 13 MG/DL (ref 7–18)
BUN BLD-MCNC: 14 MG/DL (ref 7–18)
BUN BLD-MCNC: 16 MG/DL (ref 7–18)
BUN BLD-MCNC: 24 MG/DL (ref 7–18)
BUN BLD-MCNC: 27 MG/DL (ref 7–18)
CALCIUM BLD-MCNC: 8.3 MG/DL (ref 8.5–10.1)
CALCIUM BLD-MCNC: 8.5 MG/DL (ref 8.5–10.1)
CALCIUM BLD-MCNC: 8.6 MG/DL (ref 8.5–10.1)
CALCIUM BLD-MCNC: 8.7 MG/DL (ref 8.5–10.1)
CALCIUM BLD-MCNC: 9.1 MG/DL (ref 8.5–10.1)
CHLORIDE SERPL-SCNC: 114 MMOL/L (ref 98–112)
CHLORIDE SERPL-SCNC: 115 MMOL/L (ref 98–112)
CHLORIDE SERPL-SCNC: 117 MMOL/L (ref 98–112)
CHLORIDE SERPL-SCNC: 118 MMOL/L (ref 98–112)
CHLORIDE SERPL-SCNC: 122 MMOL/L (ref 98–112)
CHOLEST SERPL-MCNC: 146 MG/DL (ref ?–200)
CLARITY UR REFRACT.AUTO: CLEAR
CO2 SERPL-SCNC: 22 MMOL/L (ref 21–32)
CO2 SERPL-SCNC: 23 MMOL/L (ref 21–32)
CO2 SERPL-SCNC: 25 MMOL/L (ref 21–32)
CO2 SERPL-SCNC: 28 MMOL/L (ref 21–32)
CO2 SERPL-SCNC: 29 MMOL/L (ref 21–32)
COLOR UR AUTO: YELLOW
COLOR UR AUTO: YELLOW
CREAT BLD-MCNC: 0.48 MG/DL
CREAT BLD-MCNC: 0.54 MG/DL
CREAT BLD-MCNC: 0.71 MG/DL
CREAT BLD-MCNC: 0.72 MG/DL
CREAT BLD-MCNC: 0.89 MG/DL
EOSINOPHIL # BLD AUTO: 0 X10(3) UL (ref 0–0.7)
EOSINOPHIL # BLD AUTO: 0.01 X10(3) UL (ref 0–0.7)
EOSINOPHIL # BLD AUTO: 0.03 X10(3) UL (ref 0–0.7)
EOSINOPHIL # BLD AUTO: 0.13 X10(3) UL (ref 0–0.7)
EOSINOPHIL NFR BLD AUTO: 0 %
EOSINOPHIL NFR BLD AUTO: 0.1 %
EOSINOPHIL NFR BLD AUTO: 0.4 %
EOSINOPHIL NFR BLD AUTO: 1.7 %
ERYTHROCYTE [DISTWIDTH] IN BLOOD BY AUTOMATED COUNT: 17.7 %
ERYTHROCYTE [DISTWIDTH] IN BLOOD BY AUTOMATED COUNT: 17.9 %
ERYTHROCYTE [DISTWIDTH] IN BLOOD BY AUTOMATED COUNT: 18.6 %
ERYTHROCYTE [DISTWIDTH] IN BLOOD BY AUTOMATED COUNT: 18.8 %
EST. AVERAGE GLUCOSE BLD GHB EST-MCNC: 214 MG/DL (ref 68–126)
GFR SERPLBLD BASED ON 1.73 SQ M-ARVRAT: 60 ML/MIN/1.73M2 (ref 60–?)
GFR SERPLBLD BASED ON 1.73 SQ M-ARVRAT: 78 ML/MIN/1.73M2 (ref 60–?)
GFR SERPLBLD BASED ON 1.73 SQ M-ARVRAT: 79 ML/MIN/1.73M2 (ref 60–?)
GFR SERPLBLD BASED ON 1.73 SQ M-ARVRAT: 86 ML/MIN/1.73M2 (ref 60–?)
GFR SERPLBLD BASED ON 1.73 SQ M-ARVRAT: 88 ML/MIN/1.73M2 (ref 60–?)
GLOBULIN PLAS-MCNC: 4.3 G/DL (ref 2.8–4.4)
GLOBULIN PLAS-MCNC: 4.6 G/DL (ref 2.8–4.4)
GLUCOSE BLD-MCNC: 118 MG/DL (ref 70–99)
GLUCOSE BLD-MCNC: 120 MG/DL (ref 70–99)
GLUCOSE BLD-MCNC: 128 MG/DL (ref 70–99)
GLUCOSE BLD-MCNC: 139 MG/DL (ref 70–99)
GLUCOSE BLD-MCNC: 147 MG/DL (ref 70–99)
GLUCOSE BLD-MCNC: 153 MG/DL (ref 70–99)
GLUCOSE BLD-MCNC: 153 MG/DL (ref 70–99)
GLUCOSE BLD-MCNC: 158 MG/DL (ref 70–99)
GLUCOSE BLD-MCNC: 158 MG/DL (ref 70–99)
GLUCOSE BLD-MCNC: 163 MG/DL (ref 70–99)
GLUCOSE BLD-MCNC: 164 MG/DL (ref 70–99)
GLUCOSE BLD-MCNC: 168 MG/DL (ref 70–99)
GLUCOSE BLD-MCNC: 171 MG/DL (ref 70–99)
GLUCOSE BLD-MCNC: 177 MG/DL (ref 70–99)
GLUCOSE BLD-MCNC: 183 MG/DL (ref 70–99)
GLUCOSE BLD-MCNC: 184 MG/DL (ref 70–99)
GLUCOSE BLD-MCNC: 189 MG/DL (ref 70–99)
GLUCOSE BLD-MCNC: 199 MG/DL (ref 70–99)
GLUCOSE BLD-MCNC: 200 MG/DL (ref 70–99)
GLUCOSE BLD-MCNC: 215 MG/DL (ref 70–99)
GLUCOSE BLD-MCNC: 220 MG/DL (ref 70–99)
GLUCOSE BLD-MCNC: 224 MG/DL (ref 70–99)
GLUCOSE BLD-MCNC: 224 MG/DL (ref 70–99)
GLUCOSE BLD-MCNC: 240 MG/DL (ref 70–99)
GLUCOSE BLD-MCNC: 244 MG/DL (ref 70–99)
GLUCOSE BLD-MCNC: 250 MG/DL (ref 70–99)
GLUCOSE BLD-MCNC: 251 MG/DL (ref 70–99)
GLUCOSE BLD-MCNC: 254 MG/DL (ref 70–99)
GLUCOSE BLD-MCNC: 260 MG/DL (ref 70–99)
GLUCOSE BLD-MCNC: 262 MG/DL (ref 70–99)
GLUCOSE BLD-MCNC: 278 MG/DL (ref 70–99)
GLUCOSE BLD-MCNC: 391 MG/DL (ref 70–99)
GLUCOSE BLD-MCNC: 419 MG/DL (ref 70–99)
GLUCOSE BLD-MCNC: 47 MG/DL (ref 70–99)
GLUCOSE BLD-MCNC: 50 MG/DL (ref 70–99)
GLUCOSE BLD-MCNC: 85 MG/DL (ref 70–99)
GLUCOSE UR STRIP.AUTO-MCNC: >=500 MG/DL
GLUCOSE UR STRIP.AUTO-MCNC: NEGATIVE MG/DL
HBA1C MFR BLD: 9.1 % (ref ?–5.7)
HCT VFR BLD AUTO: 31.3 %
HCT VFR BLD AUTO: 38.5 %
HCT VFR BLD AUTO: 38.7 %
HCT VFR BLD AUTO: 41.5 %
HDLC SERPL-MCNC: 48 MG/DL (ref 40–59)
HGB BLD-MCNC: 11.6 G/DL
HGB BLD-MCNC: 11.9 G/DL
HGB BLD-MCNC: 12.7 G/DL
HGB BLD-MCNC: 9.5 G/DL
HYALINE CASTS #/AREA URNS AUTO: PRESENT /LPF
IMM GRANULOCYTES # BLD AUTO: 0.05 X10(3) UL (ref 0–1)
IMM GRANULOCYTES # BLD AUTO: 0.07 X10(3) UL (ref 0–1)
IMM GRANULOCYTES # BLD AUTO: 0.11 X10(3) UL (ref 0–1)
IMM GRANULOCYTES # BLD AUTO: 0.16 X10(3) UL (ref 0–1)
IMM GRANULOCYTES NFR BLD: 0.7 %
IMM GRANULOCYTES NFR BLD: 0.9 %
IMM GRANULOCYTES NFR BLD: 1 %
IMM GRANULOCYTES NFR BLD: 1.6 %
KETONES UR STRIP.AUTO-MCNC: 80 MG/DL
KETONES UR STRIP.AUTO-MCNC: NEGATIVE MG/DL
LDLC SERPL CALC-MCNC: 74 MG/DL (ref ?–100)
LEUKOCYTE ESTERASE UR QL STRIP.AUTO: NEGATIVE
LYMPHOCYTES # BLD AUTO: 0.8 X10(3) UL (ref 1–4)
LYMPHOCYTES # BLD AUTO: 0.84 X10(3) UL (ref 1–4)
LYMPHOCYTES # BLD AUTO: 1.11 X10(3) UL (ref 1–4)
LYMPHOCYTES # BLD AUTO: 1.54 X10(3) UL (ref 1–4)
LYMPHOCYTES NFR BLD AUTO: 11 %
LYMPHOCYTES NFR BLD AUTO: 13.7 %
LYMPHOCYTES NFR BLD AUTO: 14.5 %
LYMPHOCYTES NFR BLD AUTO: 7.8 %
MAGNESIUM SERPL-MCNC: 1.6 MG/DL (ref 1.6–2.6)
MAGNESIUM SERPL-MCNC: 2 MG/DL (ref 1.6–2.6)
MCH RBC QN AUTO: 25.7 PG (ref 26–34)
MCH RBC QN AUTO: 25.8 PG (ref 26–34)
MCHC RBC AUTO-ENTMCNC: 30.1 G/DL (ref 31–37)
MCHC RBC AUTO-ENTMCNC: 30.4 G/DL (ref 31–37)
MCHC RBC AUTO-ENTMCNC: 30.6 G/DL (ref 31–37)
MCHC RBC AUTO-ENTMCNC: 30.7 G/DL (ref 31–37)
MCV RBC AUTO: 83.8 FL
MCV RBC AUTO: 84.3 FL
MCV RBC AUTO: 85.1 FL
MCV RBC AUTO: 85.4 FL
MONOCYTES # BLD AUTO: 0.57 X10(3) UL (ref 0.1–1)
MONOCYTES # BLD AUTO: 0.75 X10(3) UL (ref 0.1–1)
MONOCYTES # BLD AUTO: 0.82 X10(3) UL (ref 0.1–1)
MONOCYTES # BLD AUTO: 0.82 X10(3) UL (ref 0.1–1)
MONOCYTES NFR BLD AUTO: 7.3 %
MONOCYTES NFR BLD AUTO: 7.4 %
MONOCYTES NFR BLD AUTO: 7.9 %
MONOCYTES NFR BLD AUTO: 9.8 %
NEUTROPHILS # BLD AUTO: 5.54 X10 (3) UL (ref 1.5–7.7)
NEUTROPHILS # BLD AUTO: 5.54 X10(3) UL (ref 1.5–7.7)
NEUTROPHILS # BLD AUTO: 6.11 X10 (3) UL (ref 1.5–7.7)
NEUTROPHILS # BLD AUTO: 6.11 X10(3) UL (ref 1.5–7.7)
NEUTROPHILS # BLD AUTO: 8.5 X10 (3) UL (ref 1.5–7.7)
NEUTROPHILS # BLD AUTO: 8.5 X10(3) UL (ref 1.5–7.7)
NEUTROPHILS # BLD AUTO: 8.75 X10 (3) UL (ref 1.5–7.7)
NEUTROPHILS # BLD AUTO: 8.75 X10(3) UL (ref 1.5–7.7)
NEUTROPHILS NFR BLD AUTO: 72.6 %
NEUTROPHILS NFR BLD AUTO: 77.8 %
NEUTROPHILS NFR BLD AUTO: 79.6 %
NEUTROPHILS NFR BLD AUTO: 82.3 %
NITRITE UR QL STRIP.AUTO: NEGATIVE
NITRITE UR QL STRIP.AUTO: NEGATIVE
NONHDLC SERPL-MCNC: 98 MG/DL (ref ?–130)
OSMOLALITY SERPL CALC.SUM OF ELEC: 299 MOSM/KG (ref 275–295)
OSMOLALITY SERPL CALC.SUM OF ELEC: 314 MOSM/KG (ref 275–295)
OSMOLALITY SERPL CALC.SUM OF ELEC: 319 MOSM/KG (ref 275–295)
OSMOLALITY SERPL CALC.SUM OF ELEC: 320 MOSM/KG (ref 275–295)
OSMOLALITY SERPL CALC.SUM OF ELEC: 333 MOSM/KG (ref 275–295)
P AXIS: 86 DEGREES
P AXIS: 87 DEGREES
P-R INTERVAL: 178 MS
P-R INTERVAL: 200 MS
P-R INTERVAL: 212 MS
PH UR STRIP.AUTO: 6 [PH] (ref 5–8)
PH UR STRIP.AUTO: 6 [PH] (ref 5–8)
PHOSPHATE SERPL-MCNC: 3 MG/DL (ref 2.5–4.9)
PLATELET # BLD AUTO: 146 10(3)UL (ref 150–450)
PLATELET # BLD AUTO: 152 10(3)UL (ref 150–450)
PLATELET # BLD AUTO: 195 10(3)UL (ref 150–450)
PLATELET # BLD AUTO: 270 10(3)UL (ref 150–450)
POTASSIUM SERPL-SCNC: 3.4 MMOL/L (ref 3.5–5.1)
POTASSIUM SERPL-SCNC: 3.4 MMOL/L (ref 3.5–5.1)
POTASSIUM SERPL-SCNC: 3.5 MMOL/L (ref 3.5–5.1)
POTASSIUM SERPL-SCNC: 3.6 MMOL/L (ref 3.5–5.1)
POTASSIUM SERPL-SCNC: 3.8 MMOL/L (ref 3.5–5.1)
POTASSIUM SERPL-SCNC: 3.8 MMOL/L (ref 3.5–5.1)
POTASSIUM SERPL-SCNC: 3.9 MMOL/L (ref 3.5–5.1)
POTASSIUM SERPL-SCNC: 4.5 MMOL/L (ref 3.5–5.1)
PROT SERPL-MCNC: 6.6 G/DL (ref 6.4–8.2)
PROT SERPL-MCNC: 6.7 G/DL (ref 6.4–8.2)
PROT UR STRIP.AUTO-MCNC: 100 MG/DL
PROT UR STRIP.AUTO-MCNC: NEGATIVE MG/DL
Q-T INTERVAL: 384 MS
Q-T INTERVAL: 390 MS
Q-T INTERVAL: 390 MS
QRS DURATION: 110 MS
QRS DURATION: 112 MS
QRS DURATION: 120 MS
QTC CALCULATION (BEZET): 474 MS
QTC CALCULATION (BEZET): 487 MS
QTC CALCULATION (BEZET): 503 MS
R AXIS: 13 DEGREES
R AXIS: 17 DEGREES
R AXIS: 31 DEGREES
RBC # BLD AUTO: 3.68 X10(6)UL
RBC # BLD AUTO: 4.51 X10(6)UL
RBC # BLD AUTO: 4.62 X10(6)UL
RBC # BLD AUTO: 4.92 X10(6)UL
RBC UR QL AUTO: NEGATIVE
SARS-COV-2 RNA RESP QL NAA+PROBE: NOT DETECTED
SARS-COV-2 RNA RESP QL NAA+PROBE: NOT DETECTED
SODIUM SERPL-SCNC: 142 MMOL/L (ref 136–145)
SODIUM SERPL-SCNC: 147 MMOL/L (ref 136–145)
SODIUM SERPL-SCNC: 148 MMOL/L (ref 136–145)
SODIUM SERPL-SCNC: 150 MMOL/L (ref 136–145)
SODIUM SERPL-SCNC: 152 MMOL/L (ref 136–145)
SP GR UR STRIP.AUTO: 1.01 (ref 1–1.03)
SP GR UR STRIP.AUTO: >1.03 (ref 1–1.03)
T AXIS: -84 DEGREES
T AXIS: 125 DEGREES
T AXIS: 194 DEGREES
TRIGL SERPL-MCNC: 137 MG/DL (ref 30–149)
TROPONIN I HIGH SENSITIVITY: 36 NG/L
UROBILINOGEN UR STRIP.AUTO-MCNC: 1 MG/DL
UROBILINOGEN UR STRIP.AUTO-MCNC: 2 MG/DL
VENTRICULAR RATE: 100 BPM
VENTRICULAR RATE: 89 BPM
VENTRICULAR RATE: 97 BPM
VLDLC SERPL CALC-MCNC: 21 MG/DL (ref 0–30)
WBC # BLD AUTO: 10.3 X10(3) UL (ref 4–11)
WBC # BLD AUTO: 11.2 X10(3) UL (ref 4–11)
WBC # BLD AUTO: 7.6 X10(3) UL (ref 4–11)
WBC # BLD AUTO: 7.7 X10(3) UL (ref 4–11)

## 2023-01-01 PROCEDURE — 95819 EEG AWAKE AND ASLEEP: CPT | Performed by: OTHER

## 2023-01-01 PROCEDURE — 99223 1ST HOSP IP/OBS HIGH 75: CPT | Performed by: INTERNAL MEDICINE

## 2023-01-01 PROCEDURE — 99233 SBSQ HOSP IP/OBS HIGH 50: CPT | Performed by: HOSPITALIST

## 2023-01-01 PROCEDURE — 70450 CT HEAD/BRAIN W/O DYE: CPT | Performed by: EMERGENCY MEDICINE

## 2023-01-01 PROCEDURE — 99232 SBSQ HOSP IP/OBS MODERATE 35: CPT | Performed by: INTERNAL MEDICINE

## 2023-01-01 PROCEDURE — 99232 SBSQ HOSP IP/OBS MODERATE 35: CPT | Performed by: NURSE PRACTITIONER

## 2023-01-01 PROCEDURE — 93880 EXTRACRANIAL BILAT STUDY: CPT | Performed by: OTHER

## 2023-01-01 PROCEDURE — 70551 MRI BRAIN STEM W/O DYE: CPT | Performed by: OTHER

## 2023-01-01 PROCEDURE — 99232 SBSQ HOSP IP/OBS MODERATE 35: CPT | Performed by: OTHER

## 2023-01-01 PROCEDURE — 71045 X-RAY EXAM CHEST 1 VIEW: CPT | Performed by: EMERGENCY MEDICINE

## 2023-01-01 PROCEDURE — 99232 SBSQ HOSP IP/OBS MODERATE 35: CPT | Performed by: HOSPITALIST

## 2023-01-01 PROCEDURE — 93306 TTE W/DOPPLER COMPLETE: CPT | Performed by: INTERNAL MEDICINE

## 2023-01-01 PROCEDURE — 99497 ADVNCD CARE PLAN 30 MIN: CPT | Performed by: INTERNAL MEDICINE

## 2023-01-01 PROCEDURE — 99223 1ST HOSP IP/OBS HIGH 75: CPT | Performed by: OTHER

## 2023-01-01 PROCEDURE — 99239 HOSP IP/OBS DSCHRG MGMT >30: CPT | Performed by: INTERNAL MEDICINE

## 2023-01-01 RX ORDER — ATORVASTATIN CALCIUM 40 MG/1
40 TABLET, FILM COATED ORAL NIGHTLY
Qty: 30 TABLET | Refills: 2 | Status: SHIPPED | OUTPATIENT
Start: 2023-01-01 | End: 2023-02-24

## 2023-01-01 RX ORDER — ONDANSETRON 2 MG/ML
4 INJECTION INTRAMUSCULAR; INTRAVENOUS EVERY 6 HOURS PRN
Status: DISCONTINUED | OUTPATIENT
Start: 2023-01-01 | End: 2023-01-01

## 2023-01-01 RX ORDER — FUROSEMIDE 10 MG/ML
40 INJECTION INTRAMUSCULAR; INTRAVENOUS EVERY 8 HOURS PRN
Status: DISCONTINUED | OUTPATIENT
Start: 2023-01-01 | End: 2023-01-01

## 2023-01-01 RX ORDER — BISACODYL 10 MG
10 SUPPOSITORY, RECTAL RECTAL
COMMUNITY
End: 2023-01-01

## 2023-01-01 RX ORDER — MORPHINE SULFATE 20 MG/ML
5 SOLUTION ORAL
COMMUNITY
Start: 2022-01-01

## 2023-01-01 RX ORDER — HYDROCORTISONE ACETATE 25 MG/1
25 SUPPOSITORY RECTAL 2 TIMES DAILY PRN
Status: DISCONTINUED | OUTPATIENT
Start: 2023-01-01 | End: 2023-01-01

## 2023-01-01 RX ORDER — LINAGLIPTIN 5 MG/1
5 TABLET, FILM COATED ORAL DAILY
Status: ON HOLD | COMMUNITY
End: 2023-01-01

## 2023-01-01 RX ORDER — NICOTINE POLACRILEX 4 MG
15 LOZENGE BUCCAL
Status: DISCONTINUED | OUTPATIENT
Start: 2023-01-01 | End: 2023-01-01

## 2023-01-01 RX ORDER — NICOTINE POLACRILEX 4 MG
30 LOZENGE BUCCAL
Status: DISCONTINUED | OUTPATIENT
Start: 2023-01-01 | End: 2023-01-01

## 2023-01-01 RX ORDER — SENNA AND DOCUSATE SODIUM 50; 8.6 MG/1; MG/1
1 TABLET, FILM COATED ORAL 2 TIMES DAILY PRN
Status: ON HOLD | COMMUNITY
End: 2023-01-01

## 2023-01-01 RX ORDER — SODIUM CHLORIDE 9 MG/ML
1000 INJECTION, SOLUTION INTRAVENOUS ONCE
Status: COMPLETED | OUTPATIENT
Start: 2023-01-01 | End: 2023-01-01

## 2023-01-01 RX ORDER — SENNOSIDES 8.6 MG
17.2 TABLET ORAL NIGHTLY PRN
Status: DISCONTINUED | OUTPATIENT
Start: 2023-01-01 | End: 2023-01-01

## 2023-01-01 RX ORDER — ASPIRIN 325 MG
325 TABLET ORAL DAILY
Status: DISCONTINUED | OUTPATIENT
Start: 2023-01-01 | End: 2023-01-01

## 2023-01-01 RX ORDER — ATORVASTATIN CALCIUM 40 MG/1
40 TABLET, FILM COATED ORAL NIGHTLY
Status: DISCONTINUED | OUTPATIENT
Start: 2023-01-01 | End: 2023-01-01

## 2023-01-01 RX ORDER — ASPIRIN 325 MG
325 TABLET ORAL DAILY
Qty: 30 TABLET | Refills: 2 | Status: SHIPPED | OUTPATIENT
Start: 2023-01-01 | End: 2023-02-24

## 2023-01-01 RX ORDER — METOCLOPRAMIDE HYDROCHLORIDE 5 MG/ML
10 INJECTION INTRAMUSCULAR; INTRAVENOUS EVERY 8 HOURS PRN
Status: DISCONTINUED | OUTPATIENT
Start: 2023-01-01 | End: 2023-01-01

## 2023-01-01 RX ORDER — SCOLOPAMINE TRANSDERMAL SYSTEM 1 MG/1
1 PATCH, EXTENDED RELEASE TRANSDERMAL
Status: DISCONTINUED | OUTPATIENT
Start: 2023-01-01 | End: 2023-01-01

## 2023-01-01 RX ORDER — MAGNESIUM OXIDE 400 MG/1
400 TABLET ORAL DAILY
COMMUNITY

## 2023-01-01 RX ORDER — GLYCOPYRROLATE 0.2 MG/ML
0.4 INJECTION, SOLUTION INTRAMUSCULAR; INTRAVENOUS
Status: DISCONTINUED | OUTPATIENT
Start: 2023-01-01 | End: 2023-01-01

## 2023-01-01 RX ORDER — ACETAMINOPHEN 325 MG/1
650 TABLET ORAL EVERY 4 HOURS PRN
Status: DISCONTINUED | OUTPATIENT
Start: 2023-01-01 | End: 2023-01-01

## 2023-01-01 RX ORDER — ACETAMINOPHEN 650 MG/1
650 SUPPOSITORY RECTAL EVERY 4 HOURS PRN
Status: DISCONTINUED | OUTPATIENT
Start: 2023-01-01 | End: 2023-01-01

## 2023-01-01 RX ORDER — SODIUM PHOSPHATE, DIBASIC AND SODIUM PHOSPHATE, MONOBASIC 7; 19 G/133ML; G/133ML
1 ENEMA RECTAL ONCE AS NEEDED
Status: DISCONTINUED | OUTPATIENT
Start: 2023-01-01 | End: 2023-01-01

## 2023-01-01 RX ORDER — ACETAMINOPHEN 500 MG
500 TABLET ORAL EVERY 4 HOURS PRN
Status: DISCONTINUED | OUTPATIENT
Start: 2023-01-01 | End: 2023-01-01

## 2023-01-01 RX ORDER — MIRTAZAPINE 15 MG/1
15 TABLET, FILM COATED ORAL NIGHTLY
Status: DISCONTINUED | OUTPATIENT
Start: 2023-01-01 | End: 2023-01-01

## 2023-01-01 RX ORDER — DONEPEZIL HYDROCHLORIDE 5 MG/1
5 TABLET, FILM COATED ORAL NIGHTLY
Status: DISCONTINUED | OUTPATIENT
Start: 2023-01-01 | End: 2023-01-01

## 2023-01-01 RX ORDER — LABETALOL HYDROCHLORIDE 5 MG/ML
10 INJECTION, SOLUTION INTRAVENOUS EVERY 10 MIN PRN
Status: DISCONTINUED | OUTPATIENT
Start: 2023-01-01 | End: 2023-01-01

## 2023-01-01 RX ORDER — SODIUM CHLORIDE 9 MG/ML
INJECTION, SOLUTION INTRAVENOUS CONTINUOUS
Status: DISCONTINUED | OUTPATIENT
Start: 2023-01-01 | End: 2023-01-01

## 2023-01-01 RX ORDER — ASPIRIN 300 MG/1
300 SUPPOSITORY RECTAL DAILY
Status: DISCONTINUED | OUTPATIENT
Start: 2023-01-01 | End: 2023-01-01

## 2023-01-01 RX ORDER — LEVETIRACETAM 500 MG/5ML
500 INJECTION, SOLUTION, CONCENTRATE INTRAVENOUS EVERY 12 HOURS
Status: DISCONTINUED | OUTPATIENT
Start: 2023-01-01 | End: 2023-01-01

## 2023-01-01 RX ORDER — LOPERAMIDE HYDROCHLORIDE 2 MG/1
2 TABLET ORAL EVERY 8 HOURS PRN
COMMUNITY

## 2023-01-01 RX ORDER — MORPHINE SULFATE 2 MG/ML
1 INJECTION, SOLUTION INTRAMUSCULAR; INTRAVENOUS
Status: DISCONTINUED | OUTPATIENT
Start: 2023-01-01 | End: 2023-01-01

## 2023-01-01 RX ORDER — ECHINACEA PURPUREA EXTRACT 125 MG
1 TABLET ORAL
Status: DISCONTINUED | OUTPATIENT
Start: 2023-01-01 | End: 2023-01-01

## 2023-01-01 RX ORDER — ACETAMINOPHEN 650 MG/1
650 SUPPOSITORY RECTAL EVERY 6 HOURS PRN
Status: DISCONTINUED | OUTPATIENT
Start: 2023-01-01 | End: 2023-01-01

## 2023-01-01 RX ORDER — BISACODYL 10 MG
10 SUPPOSITORY, RECTAL RECTAL
Status: DISCONTINUED | OUTPATIENT
Start: 2023-01-01 | End: 2023-01-01

## 2023-01-01 RX ORDER — HYDRALAZINE HYDROCHLORIDE 20 MG/ML
10 INJECTION INTRAMUSCULAR; INTRAVENOUS EVERY 2 HOUR PRN
Status: DISCONTINUED | OUTPATIENT
Start: 2023-01-01 | End: 2023-01-01

## 2023-01-01 RX ORDER — HYOSCYAMINE SULFATE 0.125 MG
0.12 TABLET,DISINTEGRATING ORAL EVERY 4 HOURS PRN
COMMUNITY

## 2023-01-01 RX ORDER — HEPARIN SODIUM 5000 [USP'U]/ML
5000 INJECTION, SOLUTION INTRAVENOUS; SUBCUTANEOUS EVERY 8 HOURS SCHEDULED
Status: DISCONTINUED | OUTPATIENT
Start: 2023-01-01 | End: 2023-01-01

## 2023-01-01 RX ORDER — METOPROLOL TARTRATE 5 MG/5ML
5 INJECTION INTRAVENOUS EVERY 6 HOURS
Status: DISCONTINUED | OUTPATIENT
Start: 2023-01-01 | End: 2023-01-01

## 2023-01-01 RX ORDER — ATROPINE SULFATE 10 MG/ML
2 SOLUTION/ DROPS OPHTHALMIC EVERY 2 HOUR PRN
Status: DISCONTINUED | OUTPATIENT
Start: 2023-01-01 | End: 2023-01-01

## 2023-01-01 RX ORDER — MAGNESIUM SULFATE HEPTAHYDRATE 40 MG/ML
2 INJECTION, SOLUTION INTRAVENOUS ONCE
Status: COMPLETED | OUTPATIENT
Start: 2023-01-01 | End: 2023-01-01

## 2023-01-01 RX ORDER — METOCLOPRAMIDE HYDROCHLORIDE 5 MG/ML
5 INJECTION INTRAMUSCULAR; INTRAVENOUS EVERY 8 HOURS PRN
Status: DISCONTINUED | OUTPATIENT
Start: 2023-01-01 | End: 2023-01-01

## 2023-01-01 RX ORDER — ASPIRIN 300 MG/1
300 SUPPOSITORY RECTAL ONCE
Status: COMPLETED | OUTPATIENT
Start: 2023-01-01 | End: 2023-01-01

## 2023-01-01 RX ORDER — HALOPERIDOL 5 MG/ML
1 INJECTION INTRAMUSCULAR
Status: DISCONTINUED | OUTPATIENT
Start: 2023-01-01 | End: 2023-01-01

## 2023-01-01 RX ORDER — MEMANTINE HYDROCHLORIDE 10 MG/1
10 TABLET ORAL 2 TIMES DAILY
Status: DISCONTINUED | OUTPATIENT
Start: 2023-01-01 | End: 2023-01-01

## 2023-01-01 RX ORDER — LEVETIRACETAM 500 MG/5ML
500 INJECTION, SOLUTION, CONCENTRATE INTRAVENOUS EVERY 12 HOURS
Status: CANCELLED | OUTPATIENT
Start: 2023-01-01

## 2023-01-01 RX ORDER — ACETAMINOPHEN 650 MG/1
650 SUPPOSITORY RECTAL EVERY 4 HOURS PRN
COMMUNITY

## 2023-01-01 RX ORDER — SODIUM CHLORIDE 450 MG/100ML
INJECTION, SOLUTION INTRAVENOUS CONTINUOUS
Status: DISCONTINUED | OUTPATIENT
Start: 2023-01-01 | End: 2023-01-01

## 2023-01-01 RX ORDER — INSULIN GLARGINE-YFGN 100 [IU]/ML
3 INJECTION, SOLUTION SUBCUTANEOUS 3 TIMES DAILY
COMMUNITY
Start: 2023-01-01 | End: 2023-01-01

## 2023-01-01 RX ORDER — POTASSIUM CHLORIDE 14.9 MG/ML
20 INJECTION INTRAVENOUS ONCE
Status: COMPLETED | OUTPATIENT
Start: 2023-01-01 | End: 2023-01-01

## 2023-01-01 RX ORDER — LORAZEPAM 2 MG/ML
0.5 INJECTION INTRAMUSCULAR EVERY 6 HOURS PRN
Status: DISCONTINUED | OUTPATIENT
Start: 2023-01-01 | End: 2023-01-01

## 2023-01-01 RX ORDER — LORAZEPAM 1 MG/1
1 TABLET ORAL EVERY 4 HOURS PRN
Status: ON HOLD | COMMUNITY
End: 2023-01-01

## 2023-01-01 RX ORDER — ENOXAPARIN SODIUM 100 MG/ML
40 INJECTION SUBCUTANEOUS DAILY
Status: DISCONTINUED | OUTPATIENT
Start: 2023-01-01 | End: 2023-01-01

## 2023-01-01 RX ORDER — MORPHINE SULFATE 2 MG/ML
1 INJECTION, SOLUTION INTRAMUSCULAR; INTRAVENOUS EVERY 2 HOUR PRN
Status: DISCONTINUED | OUTPATIENT
Start: 2023-01-01 | End: 2023-01-01

## 2023-01-01 RX ORDER — SODIUM CHLORIDE, SODIUM LACTATE, POTASSIUM CHLORIDE, CALCIUM CHLORIDE 600; 310; 30; 20 MG/100ML; MG/100ML; MG/100ML; MG/100ML
INJECTION, SOLUTION INTRAVENOUS CONTINUOUS
Status: DISCONTINUED | OUTPATIENT
Start: 2023-01-01 | End: 2023-01-01

## 2023-01-01 RX ORDER — DEXTROSE, SODIUM CHLORIDE, AND POTASSIUM CHLORIDE 5; .45; .075 G/100ML; G/100ML; G/100ML
INJECTION INTRAVENOUS CONTINUOUS
Status: DISCONTINUED | OUTPATIENT
Start: 2023-01-01 | End: 2023-01-01

## 2023-01-01 RX ORDER — ASPIRIN 325 MG
325 TABLET, DELAYED RELEASE (ENTERIC COATED) ORAL DAILY
Status: DISCONTINUED | OUTPATIENT
Start: 2023-01-01 | End: 2023-01-01

## 2023-01-01 RX ORDER — POLYETHYLENE GLYCOL 3350 17 G/17G
17 POWDER, FOR SOLUTION ORAL DAILY PRN
Status: DISCONTINUED | OUTPATIENT
Start: 2023-01-01 | End: 2023-01-01

## 2023-01-01 RX ORDER — MORPHINE SULFATE 2 MG/ML
2 INJECTION, SOLUTION INTRAMUSCULAR; INTRAVENOUS EVERY 2 HOUR PRN
Status: DISCONTINUED | OUTPATIENT
Start: 2023-01-01 | End: 2023-01-01

## 2023-01-01 RX ORDER — MELATONIN
3 NIGHTLY PRN
Status: DISCONTINUED | OUTPATIENT
Start: 2023-01-01 | End: 2023-01-01

## 2023-01-01 RX ORDER — ACETAMINOPHEN 500 MG
1000 TABLET ORAL EVERY 8 HOURS PRN
Status: DISCONTINUED | OUTPATIENT
Start: 2023-01-01 | End: 2023-01-01

## 2023-01-01 RX ORDER — LEVETIRACETAM 1000 MG/1
500 TABLET ORAL 2 TIMES DAILY
Qty: 30 TABLET | Refills: 2 | Status: SHIPPED | OUTPATIENT
Start: 2023-01-01 | End: 2023-02-24

## 2023-01-01 RX ORDER — DEXTROSE MONOHYDRATE 25 G/50ML
50 INJECTION, SOLUTION INTRAVENOUS
Status: DISCONTINUED | OUTPATIENT
Start: 2023-01-01 | End: 2023-01-01

## 2023-01-01 RX ORDER — LORAZEPAM 2 MG/ML
1 INJECTION INTRAMUSCULAR EVERY 4 HOURS PRN
Status: DISCONTINUED | OUTPATIENT
Start: 2023-01-01 | End: 2023-01-01

## 2023-01-01 RX ORDER — MORPHINE SULFATE 2 MG/ML
0.5 INJECTION, SOLUTION INTRAMUSCULAR; INTRAVENOUS EVERY 2 HOUR PRN
Status: DISCONTINUED | OUTPATIENT
Start: 2023-01-01 | End: 2023-01-01

## 2023-01-01 RX ORDER — LORAZEPAM 2 MG/ML
0.5 SOLUTION, CONCENTRATE ORAL EVERY 4 HOURS PRN
COMMUNITY
Start: 2022-01-01

## 2023-02-09 ENCOUNTER — HOSPITAL ENCOUNTER (INPATIENT)
Facility: HOSPITAL | Age: 88
LOS: 5 days | Discharge: HOSPICE | End: 2023-02-15
Attending: EMERGENCY MEDICINE | Admitting: INTERNAL MEDICINE
Payer: MEDICARE

## 2023-02-09 ENCOUNTER — APPOINTMENT (OUTPATIENT)
Dept: GENERAL RADIOLOGY | Facility: HOSPITAL | Age: 88
End: 2023-02-09
Attending: EMERGENCY MEDICINE
Payer: MEDICARE

## 2023-02-09 ENCOUNTER — APPOINTMENT (OUTPATIENT)
Dept: CT IMAGING | Facility: HOSPITAL | Age: 88
End: 2023-02-09
Attending: EMERGENCY MEDICINE
Payer: MEDICARE

## 2023-02-09 PROBLEM — R56.9 SEIZURE (HCC): Status: ACTIVE | Noted: 2023-02-09

## 2023-02-09 PROBLEM — I63.9 CEREBROVASCULAR ACCIDENT (CVA), UNSPECIFIED MECHANISM (HCC): Status: ACTIVE | Noted: 2023-02-09

## 2023-02-09 PROBLEM — R56.9 SEIZURE (HCC): Status: ACTIVE | Noted: 2023-01-01

## 2023-02-09 PROBLEM — I63.9 CEREBROVASCULAR ACCIDENT (CVA), UNSPECIFIED MECHANISM (HCC): Status: ACTIVE | Noted: 2023-01-01

## 2023-02-09 NOTE — CM/SW NOTE
MELISSA spoke with [facility] Criselda Arreaga, who stated the patient's hospice has been revoked.

## 2023-02-09 NOTE — ED QUICK NOTES
Orders for admission, patient is aware of plan and ready to go upstairs. Any questions, please call ED RN cecilio at extension [phone].      Patient Covid vaccination status: Unvaccinated     COVID Test Ordered in ED: Rapid SARS-CoV-2 by PCR    COVID Suspicion at Admission: Low clinical suspicion for COVID    Running Infusions:  None    Mental Status/LOC at time of transport: A&ox1 at baseline    Other pertinent information: sacral wound noted  CIWA score: N/A   NIH score:  N/A

## 2023-02-09 NOTE — ED INITIAL ASSESSMENT (HPI)
Pt to ER via EMS from [address] [place] [address] for a possible seizure. Per medics, nursing home staff said patient had a 30 minute seizures. Medics noticed jaw tremors. 10 mg versed IM given by medics en route. No hx of seizures. A.ox1 @ baseline.

## 2023-02-09 NOTE — HOSPICE RN NOTE
[facility] nursing visit for home hospice patient from [facility]. Sent to ED for seizure like symptoms. Family decided on full work up and treatment. Hospice revocation form signed by son Sophia Choudhary. [facility] will follow up with this patient and family post hospitalization. Please call [facility] with any questions or concerns.     Antonio Castillo RN, [address] Liaison  [phone] after hours

## 2023-02-10 ENCOUNTER — APPOINTMENT (OUTPATIENT)
Dept: CV DIAGNOSTICS | Facility: HOSPITAL | Age: 88
End: 2023-02-10
Attending: INTERNAL MEDICINE
Payer: MEDICARE

## 2023-02-10 ENCOUNTER — APPOINTMENT (OUTPATIENT)
Dept: MRI IMAGING | Facility: HOSPITAL | Age: 88
End: 2023-02-10
Attending: Other
Payer: MEDICARE

## 2023-02-10 ENCOUNTER — NURSE ONLY (OUTPATIENT)
Dept: ELECTROPHYSIOLOGY | Facility: HOSPITAL | Age: 88
End: 2023-02-10
Attending: NURSE PRACTITIONER
Payer: MEDICARE

## 2023-02-10 NOTE — CM/SW NOTE
SW received referral, aidin placed. Meeting set for 1pm tomorrow . Son wanting to know if [facility] will accept back due to current condition.

## 2023-02-10 NOTE — OCCUPATIONAL THERAPY NOTE
Orders received charts reviewed. OT attempted to see pt for eval this AM pt sleeping unable to arouse with verbal and tactile stimuli. OT will follow up as able and appropriate.

## 2023-02-10 NOTE — PHYSICAL THERAPY NOTE
Order received for PT eval and chart reviewed. Attempted to see Pt this am, however, Pt lethargic and unable to arouse to participate in PT eval. Pt unable to open eyes or follow commands despite auditory or tactile stimuli. PT will continue to follow.

## 2023-02-10 NOTE — PLAN OF CARE
Assumed care of patient at 1. Stroke protocol: Neurochecks, NIH, Accuchecks  Difficult to assess due to patient's  somnolence. Neurology on consult. Vital signs stable. Afebrile. 1L NC, SpO2 above 94%. NSR on tele. Lovenox for VTE prophylaxis. Electrolytes replaced. NPO till Speech eval.  No signs of nausea,vomiting,diarrhea, pain or distress. Purewick and brief in place for incontinence. Bedrest till PT/OT eval.  Reposition as needed. Right FA IV infusing LR at 50ml/hr. See MAR/Flowsheets for further information. Updated granddaughter on plan of care. Problem: Patient/Family Goals  Goal: Patient/Family Long Term Goal  Description: Patient's Long Term Goal: Stroke    Interventions:  - MD Consults  - NPO till SLP eval  - PT/OT eval  - Stroke protocol  - See additional Care Plan goals for specific interventions  Outcome: Progressing  Goal: Patient/Family Short Term Goal  Description: Patient's Short Term Goal: Continue stroke protocol.     Interventions:   - Neuro checks  - NIH Daily  - Accu checks  - SLP  - PT/OT   - Neuro consult  - See additional Care Plan goals for specific interventions  Outcome: Progressing

## 2023-02-11 NOTE — PROGRESS NOTES
A+O x 0  1L per NC  Opened eyes x 1 during 2000 assessment  Moans with movement  Neuro checks q 4 in place  Tele SR with occasional PVCs  IV keppra and subcutaneous lovenox administered  Heel boots in place  Wound dressings clean, dry, intact  Will continue to monitor.

## 2023-02-11 NOTE — SLP NOTE
SPEECH DAILY NOTE - INPATIENT    ASSESSMENT & PLAN   ASSESSMENT  The patient was seen for re-assessment for oropharyngeal swallow. The patient's family is considering transferring to hospice care, pending the patient's ability to eat by mouth. The patient was seen in room, at bedside. The patient's son, daughter in law, daughter and son in law were present for session. The family reports the patient was under hospice care at [facility] and decided to end care with hospice when the patient was having suspected seizure at facility. The patient's family is considering hospice versus [facility] now. The patient was lethargic at baseline, opening eyes briefly to tactile stimulation, and then returning to eyes closed position. The patient has constant movement of lips. The patient does not follow commands for oral motor assessment. The patient is non-verbal.     The patient was given trials of thin liquids via teaspoon, cup and straw. The patient demonstrates improved bolus acceptance via straw. Orally, the patient holds bolus for up to 9 seconds before initiating pharyngeal swallow. Suspect decreased laryngeal elevation to palpation. The patient has delayed congested cough after trials of thin liquid x3. The patient was given trials of mildly thick liquids via straw. The patient demonstrates improved oral control with swallow response after 5 seconds. The patient demonstrates oxygen desaturation after trials, down to 70% on 1L per nasal cannula. The RN Megha Molina came in and adjusted monitor and patient came back up to 100%. The patient was given trials of puree solids with noted moderate oral defensiveness to teaspoon presentations. The patient demonstrates oral holding of bolus up to 4 seconds.     Diet Recommendations - Solids: NPO  Diet Recommendations - Liquids: NPO     The patient is difficult to accurately assess at bedside due to the patient being non-verbal and not consistently following commands. Compensatory Strategies Recommended:  (n/a)  Aspiration Precautions:  (n/a)  Medication Administration Recommendations: Non-oral    Patient Experiencing Pain: No                Discharge Recommendations  Discharge Recommendations/Plan: Sub-acute rehabilitation    Treatment Plan  Treatment Plan/Recommendations: SLP to reassess;Videofluoroscopic swallow study, pending decision regarding transition to back to hospice care. Interdisciplinary Communication: Discussed with RN            GOALS  Goal #1 On-going assessment of swallow function.   In Progress     FOLLOW UP  Follow Up Needed (Documentation Required): Yes  SLP Follow-up Date: 02/12/23  Number of Visits to Meet Established Goals: 3      If you have any questions, please contact Christy Ricci, [address]  Speech Language Pathologist  Office phone: [phone]  Pager: [phone], #[phone]

## 2023-02-11 NOTE — HOSPICE RN NOTE
Patient family wishes for the patient to be seen by Speech before a decision is made regarding hospice. Team to follow up once that visit happens. Family also unsure if sending the patient back to [facility] is the most appropriate or going to an SNF. Spoke with Dr Ngoc Anderson and Dr Melody Arenas regarding an inpatient hospice admission. Both are agreeable to inpatient hospice. Patient family not quite ready to make a decision regarding this and wants follow up tomorrow.

## 2023-02-11 NOTE — PHYSICAL THERAPY NOTE
PT order received and chart reviewed. Spoke with RN who stated pt very lethargic today and unable to follow commands. Plan to re-attempt as appropriate and schedule allows. RN encouraged follow up tomorrow.

## 2023-02-11 NOTE — PROGRESS NOTES
Assumed [email or web address]. A/Ox0. Moans when turning or moving. [id number] and daily NIH. Mostly CASSIDY. [email or web address] for elevated sodium. Patient now DNAR select. Family requesting hospice consult to learn about options. Meeting set for 1pm tomorrow. Aspirin per MAR rectally. Changed and turned. Unstageable sarcral pressure ulcer and heel ulcers. Wound care on consult. See note. Bunny boots in place. Staff will continue to monitor.

## 2023-02-11 NOTE — CM/SW NOTE
Per [facility] notes, they will follow up with patient/family again tomorrow regarding decision. SW/CM to remain available.

## 2023-02-11 NOTE — SLP NOTE
Attempted to see patient for follow up dysphagia treatment, and bedside swallow re-evaluation. Spoke with JULIA Raines who reports the patient is very lethargic and is currently not appropriate for po trials. Family is meeting with Hospice this afternoon to discuss goals of care. SLP will continue to follow.      [address]  Speech Language Pathologist  Office phone: [phone]  Pager: [phone], #[phone]

## 2023-02-12 ENCOUNTER — APPOINTMENT (OUTPATIENT)
Dept: ULTRASOUND IMAGING | Facility: HOSPITAL | Age: 88
End: 2023-02-12
Attending: Other
Payer: MEDICARE

## 2023-02-12 NOTE — SLP NOTE
SPEECH DAILY NOTE - INPATIENT    ASSESSMENT & PLAN   ASSESSMENT  The patient was seen for dysphagia treatment in room, at bedside. The patient is able to briefly open eyes to tactile stimulation, but then closes them again. The patient is non-verbal and does not follow commands. The patient's son and daughter in law were present for session. Family reports they plan to send patient to [facility] for a \"trial\" to see if patient can participate in therapy. The patient's family also verbalizes that if the patient is not able to pass swallow study, they would like for consideration be given for non-oral means of nutrition. The patient was given trials of thin liquids via teaspoon and straw. Deferred cup sip trials due to positioning. Suspect delayed swallow response with decreased laryngeal elevation upon palpation. The patient demonstrates immediate cough response x1 with thin liquids, but no other overt signs of aspiration noted. Could not test vocal quality as the patient is non-verbal.     The patient was given trials of nectar thick liquids via teaspoon and cup. The patient demonstrates oral holding of bolus up to 8 seconds, with suspected decreased laryngeal elevation to palpation. Again, not able to test vocal quality as patient is non-verbal.    The patient was given trials of puree solids with noted slowed A-P transfer. No signs of aspiration noted, and no oral residue after the swallow. Recommend videofluoroscopic swallow study to further assess oropharyngeal swallow function. Diet Recommendations - Solids: NPO  Diet Recommendations - Liquids: NPO     Discussed recommendations with Dr. Claus Up, with JULIA Spicer and with patient's familly.      Compensatory Strategies Recommended:  (n/a)  Aspiration Precautions:  (n/a)  Medication Administration Recommendations: Non-oral    Patient Experiencing Pain: No                Discharge Recommendations  Discharge Recommendations/Plan: Sub-acute rehabilitation    Treatment Plan  Treatment Plan/Recommendations: Videofluoroscopic swallow study    Interdisciplinary Communication: Discussed with RN  Discussed with physician            GOALS  Goal #1 The patient will participate in VFSS to further assess oropharyngeal swallow function and determine parameters of dysphagia treatment. Within 1-3 days.    In Progress     FOLLOW UP  Follow Up Needed (Documentation Required): Yes  SLP Follow-up Date: 02/13/23  Number of Visits to Meet Established Goals: 2    If you have any questions, please contact Wilbur Kanner, SLP Wilbur Kanner, [address]   Speech Language Pathologist  Office phone: [phone]  Pager: [phone], #[phone]

## 2023-02-12 NOTE — PHYSICAL THERAPY NOTE
PT order received, chart reviewed. Per RN pt is unable to follow commands, not appropriate for therapy at this time. Per EMR, awaiting decision from family regarding hospice. Will continue to follow as appropriate.

## 2023-02-12 NOTE — PLAN OF CARE
Rec'd report from previous RN, care assumed at 2100, pt assessed per flow sheets. Pt drowsy, does not follow commands, does not respond verbally to RN, therefore large portion of neuro assessment unable to assess. Pt remains on 0.5-1L nasal cannula. Pt with Purewick in place. Pt does not appear in pain. Pt with multiple wounds per flow sheets. Pt remains on [place] bed.

## 2023-02-12 NOTE — PROGRESS NOTES
Assumed [email or web address]. Withdraws to pain. Neurochecks q4hr and daily NIH. Mostly unassessable. Speech stating strict NPO. Rectal aspirin per MAR. Hospice met with family today. Hospice remains possibility and qualifies for inpatient. Other possibilities would be SNF. Family to decide likely tomorrow. Staff will continue to monitor.

## 2023-02-13 NOTE — PLAN OF CARE
End of shift note: Assumed care of pt 2/12/23 at 1900. Pt is somnolent, opens eyes at times, not able to follow commands. Does not appear to be in any pain. On tele, SR. Room air-no apparent SOB. Strict NPO-pt w/ IVF. Purewick and brief in place. Dressings changed overnight. Plan for video swallow in am. Seizure and fall precautions in place. Pt's family updated on POC, all questions answered. Problem: NEUROLOGICAL - ADULT  Goal: Achieves stable or improved neurological status  Description: INTERVENTIONS  - Assess for and report changes in neurological status  - Initiate measures to prevent increased intracranial pressure  - Maintain blood pressure and fluid volume within ordered parameters to optimize cerebral perfusion and minimize risk of hemorrhage  - Monitor temperature, glucose, and sodium.  Initiate appropriate interventions as ordered  Outcome: Progressing  Goal: Achieves maximal functionality and self care  Description: INTERVENTIONS  - Monitor swallowing and airway patency with patient fatigue and changes in neurological status  - Encourage and assist patient to increase activity and self care with guidance from PT/OT  - Encourage visually impaired, hearing impaired and aphasic patients to use assistive/communication devices  Outcome: Progressing

## 2023-02-13 NOTE — CDS QUERY
Clinical Significance - Lab Results   Lizeth Gilbert  Dear Dr. Bobbette Ormond:  Clinical information (provided below) indicates abnormal sodium values. For accurate ICD-10-CM code assignment to reflect severity of illness and risk of mortality,  Please clarify the clinical relevance for the clinical findings (included below):    (   ) Hypernatremia     (   ) Insignificant finding  (please document in your next progress note)    (   ) Other (please specify in the medical record)      Documentation from the medical record:    Possible Risk Factors: Decreased oral intake, NPO    Clinical Indicators:  ___2/9: Na 147  ___2/10: Na 152  ___2/11: 142    Treatment:   Repeat labs, IVF changed from 0.9 normal saline per stroke protocol to 0.45 Normal Saline 02/10                    If you have any questions, please contact Clinical : [email or web address]. [phone]   Thank you    THIS FORM IS A PERMANENT PART OF THE MEDICAL RECORD

## 2023-02-13 NOTE — PHYSICAL THERAPY NOTE
Pt remains somnolent, unable to follow commands with non purposeful movements. Pt is not appropriate for skilled IP PT eval at this time. PT will cont to follow peripherally and initiate therapy when appropriate. RN aware.

## 2023-02-13 NOTE — PROGRESS NOTES
Assumed care at 7:30. ST came by to evaluate, will hold off on swallow eval, neuro aware. Diet ordered. Patient is alert and oriented to self, maybe. Does not answer questions or follow commands. Tele on  RA. All safety precautions in place. Will continue to monitor patient.

## 2023-02-13 NOTE — PLAN OF CARE
Case reviewed with Dr Taryn Montes De Oca, Dr Irving Thomas. Pt with advanced dementia and now embolic stroke. 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 Given her age, advanced dementia, acute cva, dysphagia and multiple other medical co morbidities, pt would not be a candidate for invasive procedures/closure of shunt.       Sandee Corea NP  2/13/2023  4:43 PM  [phone]

## 2023-02-13 NOTE — PROGRESS NOTES
Occupational Therapy    OT orders rec'd and chart reviewed. Pt remains somnolent, unable to follow commands with non purposeful movements. Pt is not appropriate for skilled IP OT eval at this time. OT will cont to follow peripherally and initiate therapy when appropriate. RN aware. Will need repair by cardiovascular surgery

## 2023-02-13 NOTE — CM/SW NOTE
SHREYAS [facility]on palliative liaison met with family to discuss possible hospice readmission. Discussed that [facility] stated that they could take her back with a polst and hospice. Family wanting to continue with abx, Sw stated that she could receive oral abx at the facility if she needed. Son asked if she could receive bipap at facility. W asked about goals of care and what they think her mother would want. Family not ready for making hospice decision at this time. Will continue to follow up .

## 2023-02-13 NOTE — CDS QUERY
Teresita Bhakta    Dear Dr. Maximus Lindo,  Clinical information (provided below) includes documentation of Unstageable Pressure Injuries to the Sacrum, and Left Heel, Present On Admission  PLEASE INDICATE IF YOU ARE IN AGREEMENT WITH THE FOLLOWING   ASSESSMENT BY THE CONSULTANT:  Unstageable Sacral and Left Heel Pressure Injury; Present on Admission as documented by the Wound Care Clinician:  [  ] Yes, I agree with this assessment      [  ] No, I do not agree with this assessment  If not in agreement with this assessment, please provide your independent assessment of the nutritional status:  ____________________________        Documentation from the Medical Record:     Possible Risk Factors: Impaired Mobility  Clinical Information:  Wound Clinical Consult: 2/10  ASSESSMENT:  Wound 02/09/23 Pressure Injury Sacrum Mid (Active)  Date First Assessed/Time First Assessed: 02/09/23 1816   Present on Hospital Admission: Yes  Primary Wound Type: Pressure Injury  Location: Sacrum  Wound Location Orientation: Mid   Assessments 2/10/2023  8:25 AM  Wound 02/09/23 Pressure Injury Heel Left (Active)  Date First Assessed/Time First Assessed: 02/09/23 1817   Present on Hospital Admission: Yes  Primary Wound Type: Pressure Injury  Location: Heel  Wound Location Orientation: Left  Assessments 2/10/2023  8:22 AM    Both sacral and L heel wounds were present on admission. RN aware. Patient wearing B heel offloading boots. L heel with unstageable pressure ulcer, measures at (4.2cm x 3.2cm) with 100% black tissue, periwound dry, no drainage, no odor, stable wound. L heel warm, +dorsal pedal pulse noted. Sacral with unstageable pressure ulcer, measures at (6.4cm x 4.2cm) with 100% black tissue, periwound dry, no drainage, no odor, stable wound. Cleansed both, L heel with betadyne, border foam, re-applied B heel offloading boots.  Sacral with Medihoney and Border Foam.  Recommend use of Collagenase Santyl daily, also recommend switch to Clinitron Bed. RN aware, orders placed for both. Wound Cleaning and Dressings:  Showering directions: May shower and/or cleanse wound with mild soap and water  Wound cleansing:  Cleanse with normal saline or wound cleanser  Wound cleaning frequency: Daily  Wound product: Santyl and Bordered foam to the sacral wound, Betadyne and border foam to the L heel. Dressing change frequency:  Change dressing daily and/or PRN  Enzymatic agent:  Collagenase Santyl   Anesthetic:  Anesthetic: No anesthetic needed. Insensate   Compression Therapy:   No compression therapy needed   Negative Pressure Wound Therapy:  NPWT: No negative pressure device therapy needed        Miscellaneous/Additional Orders:  Offloading: EHOB cushion and Heel off-loading boot(s)  Treatment:   Wound care consult, Daily dressing changes, Clinitron Specialty Bed       For questions regarding this query, please contact Clinical Documentation : Tesfaye Glass RN [phone] [email or web address]    Thank you    THIS FORM IS A PERMANENT PART OF THE MEDICAL RECORD

## 2023-02-13 NOTE — PLAN OF CARE
Assumed care of pt @ 0730. Pt is somnolent, CASSIDY neuro status, does not follow commands, occasionally opens eyes. Does not track. No verbal sounds noted. On RA, VSS, SR on tele. IVF infusing, K replaced. Continues to be strict NPO, possibly do a video swallow tomorrow. PT/OT CASSIDY as patient is mainly somnolent and responds to pain. Pt does not appear to be in any pain  Incontinent x2. Urine vasu. IV abx added    Plan: tbd; awaiting video swallow, POA to decide on hospice or full tx? Updated POC with patient and family. Will continue to monitor. Problem: Patient/Family Goals  Goal: Patient/Family Long Term Goal  Description: Patient's Long Term Goal: Stroke    Interventions:  - MD Consults  - NPO till SLP eval  - PT/OT eval  - Stroke protocol  - See additional Care Plan goals for specific interventions  Outcome: Progressing  Goal: Patient/Family Short Term Goal  Description: Patient's Short Term Goal: Continue stroke protocol. Interventions:   - Neuro checks  - NIH Daily  - Accu checks  - SLP  - PT/OT   - Neuro consult  - See additional Care Plan goals for specific interventions  Outcome: Progressing     Problem: NEUROLOGICAL - ADULT  Goal: Achieves stable or improved neurological status  Description: INTERVENTIONS  - Assess for and report changes in neurological status  - Initiate measures to prevent increased intracranial pressure  - Maintain blood pressure and fluid volume within ordered parameters to optimize cerebral perfusion and minimize risk of hemorrhage  - Monitor temperature, glucose, and sodium.  Initiate appropriate interventions as ordered  Outcome: Progressing  Goal: Achieves maximal functionality and self care  Description: INTERVENTIONS  - Monitor swallowing and airway patency with patient fatigue and changes in neurological status  - Encourage and assist patient to increase activity and self care with guidance from PT/OT  - Encourage visually impaired, hearing impaired and aphasic patients to use assistive/communication devices  Outcome: Progressing     Problem: Delirium  Goal: Minimize duration of delirium  Description: Interventions:  - Encourage use of hearing aids, eye glasses  - Promote highest level of mobility daily  - Provide frequent reorientation  - Promote wakefulness i.e. lights on, blinds open  - Promote sleep, encourage patient's normal rest cycle i.e. lights off, TV off, minimize noise and interruptions  - Encourage family to assist in orientation and promotion of home routines  Outcome: Progressing

## 2023-02-14 NOTE — PROGRESS NOTES
Residential liaison met with son/BUDDY [place] and completed hospice consents. [place] discharging to [facility]. Adeline DUMONT notified.

## 2023-02-14 NOTE — PHYSICAL THERAPY NOTE
PT orders received, chart reviewed. Per RN, patient remains not appropriate for therapy, not alert still or following commands. Verbally per hospitalist, prior to this admission, patient has been total assist at [facility] lift dependent, total for ADLs including feeding); patient also has been on hospice, which has been revoked for this admission. Will sign off at this time. Please re-order should patient become appropriate for skilled therapy services.

## 2023-02-14 NOTE — OCCUPATIONAL THERAPY NOTE
OT orders received, chart reviewed. Per RN, patient remains not appropriate for therapy, still not alert or following commands. Verbally per hospitalist, prior to this admission, patient has been total assist at [facility] lift dependent, total for ADLs including feeding); patient also has been on hospice, which has been revoked for this admission. Will sign off at this time. Please re-order should patient become appropriate for skilled therapy services. RN and hospitalist aware and in agreement.

## 2023-02-14 NOTE — PLAN OF CARE
End of shift note: Assumed care of pt 2/13/23 at 1900. Pt is somnolent, opens eyes at times, not able to follow commands, speech incomprehensible. Does not appear to be in any pain. On tele, SR w/pvc. Room air-no apparent SOB. Purewick and brief in place. UA sent. Sacral dressing changed overnight. Bunny boots on. Seizure and fall precautions in place. Pt's family updated on POC, all questions answered    Problem: NEUROLOGICAL - ADULT  Goal: Achieves stable or improved neurological status  Description: INTERVENTIONS  - Assess for and report changes in neurological status  - Initiate measures to prevent increased intracranial pressure  - Maintain blood pressure and fluid volume within ordered parameters to optimize cerebral perfusion and minimize risk of hemorrhage  - Monitor temperature, glucose, and sodium.  Initiate appropriate interventions as ordered  Outcome: Progressing

## 2023-02-14 NOTE — CM/SW NOTE
MSW met with pt's family at bedside. Pt will return with [facility].    [facility]  [place]  Call report to [phone]

## 2023-02-15 NOTE — PROGRESS NOTES
Patient is non-verbal, drowsy/asleep, turn Q2hr  Gluc's Q6hr with SS, daily calorie count  Has Purewick in place, no bm overnight  Sacral wound with dressing in place, left heel black  Neuro Shift, NIH daily, Tele-SR, 2L NC O2  Remains on IVF D5 0.45 with 10 meq KCL @ 75ml/hr  patient is lethargic, unable to give oral meds  Will continue to monitor  Plan to return to [facility] nursing facility today

## 2023-02-15 NOTE — PLAN OF CARE
Pt has been drowsy/asleep for the day, opens eyes at times but not answering any questions or interacting. Attempted to feed her small portions but unsuccessful. Turn q 2h, bunny boots on. Ambulance scheduled to pick her up tomorrow at 9am to go back to [facility] under hospice care.

## 2023-02-16 NOTE — DISCHARGE PLANNING
NURSING DISCHARGE NOTE    Discharged Nursing home via Ambulance. Accompanied by Support staff  Belongings Taken by patient/family. Pt was scheduled for discharge today to [facility] Hospice. MD was notified of transportation time, plan was to continue with the discharge process. Medications given per [facility] prior to discharge. Pt was A&OXO, occ open eyes, withdraws from pain, neuro q shift, NIH daily. IV removed. Sacral wound dressing replaced, bunny boots in place. Report given to Andrew Jimenes RN at [facility] facility, all questions answered at the time of discharge. Pt's son was notified of the transfer. AVS printed for pt and facility.

## 2023-02-16 NOTE — PAYOR COMM NOTE
--------------  2/14 CONTINUED STAY REVIEW    Payor: [facility] MEDICARE ADV PPO  Subscriber #:  [id number]  Authorization Number: [id number]    Pt did not DC as anticipated    HOSPICE RN:  Residential liaison met with son/THONGSVEN Roberson and completed hospice consents. [place] discharging to [facility]. Adeline RN notified.

## 2023-02-20 PROBLEM — E86.0 DEHYDRATION: Status: ACTIVE | Noted: 2023-02-20

## 2023-02-20 PROBLEM — E86.0 DEHYDRATION: Status: ACTIVE | Noted: 2023-01-01

## 2023-02-20 PROBLEM — R62.7 FAILURE TO THRIVE IN ADULT: Status: ACTIVE | Noted: 2023-02-20

## 2023-02-20 PROBLEM — R62.7 FAILURE TO THRIVE IN ADULT: Status: ACTIVE | Noted: 2023-01-01

## 2023-02-20 PROBLEM — R79.89 AZOTEMIA: Status: ACTIVE | Noted: 2023-02-20

## 2023-02-20 PROBLEM — E87.0 HYPERNATREMIA: Status: ACTIVE | Noted: 2023-02-20

## 2023-02-20 PROBLEM — F03.C0 SEVERE DEMENTIA WITHOUT BEHAVIORAL DISTURBANCE, PSYCHOTIC DISTURBANCE, MOOD DISTURBANCE, OR ANXIETY, UNSPECIFIED DEMENTIA TYPE (HCC): Status: ACTIVE | Noted: 2023-01-01

## 2023-02-20 PROBLEM — E87.0 HYPERNATREMIA: Status: ACTIVE | Noted: 2023-01-01

## 2023-02-20 PROBLEM — F03.C0 SEVERE DEMENTIA WITHOUT BEHAVIORAL DISTURBANCE, PSYCHOTIC DISTURBANCE, MOOD DISTURBANCE, OR ANXIETY, UNSPECIFIED DEMENTIA TYPE (HCC): Status: ACTIVE | Noted: 2023-02-20

## 2023-02-20 PROBLEM — R79.89 AZOTEMIA: Status: ACTIVE | Noted: 2023-01-01

## 2023-02-20 NOTE — HOSPICE RN NOTE
[facility] nursing visit for this home hospice patient sent to [facility] for altered mental status. Met with daughter Karlos Santiago and son Regla Aldridge on speaker phone. Reviewed hospice benefit with questions and concerns addressed. End of life education provided. Per Regla Aldridge patient has been much less responsive for the past week and not eating, not taking medications. Family wants hospice revocation to pursue aggressive treatment. They feel Mom is starving and want her evaluated for feeding tube. Please call [facility] if family wants to refocus on comfort. Would need new hospice consult order. Hospice revocation form is signed and dated 2/20/23. Please call [facility] with any questions or concerns.     Hira Funk RN, [address] Liaison  [phone] after hours

## 2023-02-20 NOTE — ED QUICK NOTES
Pt straight cathed for urine and sent to lab. Report given to [employer].  Pt awaiting transport to floor

## 2023-02-20 NOTE — HOSPICE RN NOTE
[facility] consult order received. Patient just revoked hospice today for aggressive treatment. Family wants feeding tube. Hospice consult order received. Will follow up with this patient tomorrow. Please call [facility] with any questions or concerns.     Sheila France RN, [address] [facility] [place] Liaison  [phone] after hours

## 2023-02-20 NOTE — ED INITIAL ASSESSMENT (HPI)
Pt to ED after being altered at nursing home. Per EMS pt was a DNR in a nursing home and family now revoked DNR. Pt had fever and is altered. Daughter states pt has been this altered X 1 week.  Pt also here for stroke 2 weeks ago

## 2023-02-21 PROBLEM — I63.9 STROKE (HCC): Status: ACTIVE | Noted: 2023-01-01

## 2023-02-21 NOTE — HOSPICE RN NOTE
[facility] nursing visit for follow up on hospice consult. This patient previously on hospice with revocation twice to pursue aggressive treatment. Patient sent to ED for complaints of AMS, not eating, not able to take medications. Family revoked hospice to come here for evaluation of feeding tube placement. Hospice consult order place yesterday as patient is nearing EOL. Family having difficulty accepting that. Met again with son Oralia Lopez. Discussed hospice benefit and each level of hospice care including inpatient for symptom management needs. Discussed POLST needs to be DNR/comfort. Oralia Lopez not ready to accept comfort focused care only. He will speak with other family members today and contact hospice if needed. [facility] will continue to follow and be supportive of family. Please call [facility] with any questions or concerns.     Derryl Schaumann RN, [address] Liaison  [phone] after hours

## 2023-02-21 NOTE — CM/SW NOTE
SW noted hospice consult for patient. [facility] is aware of consult. SW will continue to follow for plan of care changes and remain available for any additional DC needs or concerns.      Cuba Garcia MSW, LSW  Discharge Planner   [phone]

## 2023-02-21 NOTE — PROGRESS NOTES
02/21/23 1628   Clinical Encounter Type   Visited With Family  (patient son)   Taxonomy   Intended Effects Promote a sense of peace   Methods Offer support   Interventions Acknowledge current situation      stopped in while family was at bedside.  reassured that  had been by to provide anointing of the sick. Patient son showed appreciation and said his mother would appreciate this.  remains available for ongoing support. Spiritual Care support can be requested via an Epic consult. DAMIEN Keith  Extension:[phone]

## 2023-02-21 NOTE — HOSPICE RN NOTE
Patient's son called, he and the family are ready to move forward with hospice, consents signed. Dr Merlin Murillo agrees to [facility] hospice. [address] [facility] Director, Dr Yue De La Cruz agrees with [facility] hospice, patient is appropriate due to pain management, dyspnea (RR 25 and labored),  and end of life care. Patient's medical chart changed to Inpatient Hospice, comfort order set placed, and patient's RN Bebemichael Rivas informed.      Codi Campbell RN, [address] [address]   [phone]

## 2023-02-21 NOTE — PLAN OF CARE
Patient's son/POA & hospice met again this afternoon. Hospice informed that son has signed consent for hospice. POLST form inititiated & partially signed,placed in paper chart. MD to sign form.

## 2023-02-21 NOTE — PROGRESS NOTES
02/21/23 1010   Clinical Encounter Type   Visited With Patient   Routine Visit Introduction   Continue Visiting No   Taxonomy   Intended Effects Promote a sense of peace   Methods Offer spiritual/Catholic support;Collaborate with care team member   Interventions Acknowledge current situation;Silent prayer      responded to consult request for anointing of sick.  spoke with nurse to verify that patient is imminent.  contacted Father Eliecer at [address] and left Voicemail request for Anointing.  stopped by room to offer support to patient/family. Patient sleeping, no family present.  remains available for ongoing support. Spiritual Care support can be requested via an Epic consult. DAMIEN Jamison [facility]  Extension: [phone]

## 2023-02-21 NOTE — PROGRESS NOTES
Residential liaison received a call back from son Sae Hart in regards to discussing hospice again today. Residential RN will meet bedside within an hour to discuss hospice.

## 2023-02-21 NOTE — PROGRESS NOTES
Residential liaison received hospice referral, no family bedside will call POA to schedule hospice meeting today. 10a, Residential Liaison spoke with son April Vernon agreed to meet bedside in an hour. Meeting scheduled to discuss hospice.

## 2023-02-21 NOTE — PROGRESS NOTES
02/21/23 1219   Taxonomy   Intended Effects Promote a sense of peace   Methods Offer support   Interventions Acknowledge current situation      received call from Stephani Mccarthy that  will arrive in the afternoon around 2:15.  will update notes once  has visited.  made nurse aware of this.  remains available for ongoing support. Spiritual Care support can be requested via an [facility] consult. ELAN Banegas. Div  Extension:[phone]

## 2023-02-21 NOTE — PLAN OF CARE
Unresponsive,breathing labored & noted moaning. Dose of ativan & morphine administered for comfort. Son at bedside & met w/ hospice. Not yet ready to stop most management like meds,insulin & potassium per hospice nurse. Hospice said he would like to speak to the rest of the family first nor is he ready for patient to be DNAR comfort. Patient was seen earlier today by wound care w/ recommendations placed in chart. Off loading boots applied. Turned patient to sides at intervals. Kept comfortable,clean & dry.

## 2023-02-22 NOTE — PROGRESS NOTES
02/22/23 4708   Clinical Encounter Type   Visited With Family   Routine Visit Follow-up   Continue Visiting No   Crisis Visit Patient actively dying   Mormon Encounters   Mormon Needs Prayer   Family Spiritual Encounters   Family Coping Open/discussion   Family Participation in Care Consistently   Family Support During Treatment Consistently   Taxonomy   Intended Effects Demonstrate caring and concern   Interventions Acknowledge current situation; Active listening; Ask guided questions; Facilitate preparing for end of life;Perform a Anabaptist rite or ritual;[place];Explain  role; Share words of hope and inspiration      gave ashes to both the patient and her granddaughter.  remains available at ext. #[phone] as needed for spiritual care.

## 2023-02-22 NOTE — SPIRITUAL CARE NOTE
[facility] Chaplain visited pt for initial assessment. Pt is not responsive and appears comfortable. Dtr, son and their spouses at bedside. Family is accepting and prepared and engaged in retelling pt life story. Family has good mutual support in place. Pt is Pentecostalism and was anointed in the hospital yesterday, 2/21/23. No issues or concerns to report.     Clemente Draft, [place] Saeed

## 2023-02-22 NOTE — DIETARY NOTE
Clinical Nutrition  Inpatient Comfort Care Order Set noted. Nutrition services will sign off at this time. Re-consult RD if further nutrition care is needed.     [facility], [address], [address]   Clinical Dietitian  Spectra: [phone]

## 2023-02-22 NOTE — HOSPICE RN NOTE
[facility] Inpatient Nursing Rounds: Pt visited at bedside, multiple family members at bedside. Pt unresponsive. RR=24, improved per family from 29 about 1hr ago when last dose of IVP morphine given. Since pt is requiring frequent IVP morphine doses, would recommend starting drip at 1mg/hr. Family aware and agreeable. Congestion also noted on auscultation. Would also recommend IVP Robinul now for secretions. 02 at 6L via NC. Discussed with family that once tachypnea more under control, would recommend weaning 02, family agreeable. Family also asking for bubbler for hydration to NC, will inform RN. PPS: 10    Pt inpatient hospice at this time for management of symptoms of dyspnea/tachypnea and pain with IV push medication and starting continuous drip now. Pt requires frequent assessment by skilled nurse for medication administration/titration and for the observation of and interventions for all current and potential symptoms related to EOL. All questions encouraged and answered, will continue to monitor for comfort. [facility] to continue to offer services and provide support to patient and family as needed. POC discussed with RN, Danelle Peralta.      Judy Orozco RN, BSN  [facility] Nurse Liaison  Office: [phone] or After Hours: [phone]

## 2023-02-22 NOTE — CM/SW NOTE
SHREYAS met with the patient's family to provide support. Patient will remain [facility] hospice. Updated floor RN.     [facility], HELIO

## 2023-02-22 NOTE — HOSPICE RN NOTE
[facility] inpatient nursing rounds. No family at bedside at this time. Patient unresponsive, appears actively dying. No objective signs of distress noted. Patient remains appropriate for inpatient level of hospice care. Needs continue frequent nursing assessments for administration and titration of IV comfort medications. Too fragile for transport to lesser setting. [facility] will continue to follow this patient closely for end of life symptom management and to support family. Please call [facility] with any questions or concerns.     Santino Diaz RN, [address] Liaison  [phone] after hours

## 2023-02-22 NOTE — HOSPICE RN NOTE
[facility] inpatient nursing rounds. Son Isabel Carson remains at bedside. No questions or concerns at this time. Patient is unresponsive and appears actively dying. Comfort order set in place. Patient is getting PRN IVP morphine and lorazepam for comfort. Morphine gtt is ordered PRN if needed. Patient with no objective signs of distress at this time. [facility] will continue to follow this patient closely for end of life symptom management and to support family. Please call [facility] with any questions or concerns.     Antonio Castillo RN, [address] Liaison  [phone] after hours

## 2023-02-22 NOTE — PROGRESS NOTES
02/21/23 1920   Clinical Encounter Type   Visited With Patient not available      responded to a [facility] request. Patient was asleep and family was not present.  will refer daytime  to visit tomorrow. Spiritual Care support can be requested via an [facility] consult or ext. [phone].        Liliana Cox M.Div, Chaplain Resident  Ext. [phone]

## 2023-02-23 NOTE — PROGRESS NOTES
23 [id number]   Clinical Encounter Type   Visited With Health care provider;Family   Routine Visit Follow-up   Crisis Visit Death   Scientologist Encounters   Scientologist Needs Pastoral care brochure;Prayer   Family Spiritual Encounters   Family Coping Accepting; Sadness   Family Participation in Care Consistently   Family Support During Treatment Consistently   Taxonomy   Intended Effects Promote a sense of peace   Methods Encourage sharing of feelings;Encourage self reflection; Offer emotional support; Offer spiritual/Islam support   Interventions Acknowledge current situation; Active listening; Identify supportive relationship(s);Davilla;Provide compassionate touch;Provide sacred reading(s); Silent prayer      responded to the page. Checked in with the RN about the status of the patient/family. Provided grief support and pastoral care to the family in midst of death. Journeyed with the family in the grief process. Affirmed the depth of their grief and loss. Provided resources regarding weekly support group that are freely available here in the hospital. Rituals at the end of life was extended. Read a comforting scripture. Prayed for peace and support for the family as per their request. Promoted a sense of peace, comfort and understanding. Family signed consent to release the body to the  home of their choice. Also, extended support to the RN. Spiritual Care support can be requested via an [facility] consult. Rev. Chetan Johnson M.Div., M.T.S., [facility] CGCS.   Spiritual Care Lead

## 2023-02-23 NOTE — PLAN OF CARE
Patient with tachypnea, IV push morphine and IV robinol given with relief. Morphine drip started and titrated per orders for tachypnea per comfort order sets. Improvement in RR noted upon reassessment of interventions. Family including daughter, CRESENCIO and son at bedside, updated and in agreement with POC, education provided. Weaning supplemental oxygen. Repositioning and skin cares provided. Bunny boots to BLE in place. Patient non verbal and non responsive, no evidence of agitation or restlessness noted. Patient does not open eyes to name or speech. Family at bedside, support provided. Patient resting in bed.

## 2023-02-23 NOTE — PLAN OF CARE
Patient remains unresponsive. Breathing shallow, irregular. Appears comfortable. Continues on morphine gtt. Oral care done, turned and repositioned for comfort. T. Max 101.8, prn Tylenol/ cold packs given. Emotional support given to family. . family at the bedside.    Problem: COPING  Goal: Pt/Family able to verbalize concerns and demonstrate effective coping strategies  Description: INTERVENTIONS:  - Assist patient/family to identify coping skills, available support systems and cultural and spiritual values  - Provide emotional support, including active listening and acknowledgement of concerns of patient and caregivers  - Reduce environmental stimuli, as able  - Instruct patient/family in relaxation techniques, as appropriate  - Assess for spiritual and psychosocial needs and initiate Spiritual Care or Behavioral Health consult as needed  Outcome: Progressing

## 2023-02-23 NOTE — PLAN OF CARE
Writer called into room by family stating that patient was no longer breathing. Assessment to confirm time of death performed by writer and charge [employer], patient . Time of death 36. Family/adult children including Arleen Bauman present at bedside. Dr. Tyler Allen aware. Hospice aware.  at bedside. [facility] paperwork completed. Family remains at bedside at this time. Emotional support provided. [facility] notified, per Ira Hannah at [facility], patient does not qualify for potential donation d/t her age. Hospice informed and at bedside.

## 2023-02-23 NOTE — HOSPICE RN NOTE
[facility] RN notified of TOD by Winter Silva, confirmed 1320PM. Family bereaved at bedside and currently calling [facility] in [place] to notify of patient passing.  already speaking with family. Encouraged to reach out to [facility] for any additional support.      Angelita Gay, [facility]  [phone]  Office: [phone]

## 2023-02-23 NOTE — HOSPICE RN NOTE
[facility] Inpatient Nursing Rounds: pt visited at bedside, family still present. Pt remains unresponsive. Morphine drip at 2mg/hr, pt has also had several IVP morphine doses PRN. Pt continues to have RR=24, recommending trial of IVP ativan x's 1 PRN to see if tachypnea improves. Family was agreeable to lowering 02 from 6L to 3L to see if pt tolerates as she is only mouth breathing at this time and 02 is not likely contributing to her comfort. If in 1-2 hrs pt still without dyspnea, family agreeable to remove 02. RN Jennifer Rivas aware and verbalized understanding. Granddaughter plans to spend the night. [facility] to continue to provide support and services as needed. Please call #[phone] number for any questions/concerns or if patient passes.

## (undated) DEVICE — MEDI-VAC NON-CONDUCTIVE SUCTION TUBING: Brand: CARDINAL HEALTH

## (undated) DEVICE — 1200CC GUARDIAN II: Brand: GUARDIAN

## (undated) DEVICE — ENDOSCOPY PACK - LOWER: Brand: MEDLINE INDUSTRIES, INC.

## (undated) DEVICE — MEDI-VAC SUCTION HANDLE REGULAR CAPACITY: Brand: CARDINAL HEALTH

## (undated) DEVICE — 3M™ RED DOT™ MONITORING ELECTRODE WITH FOAM TAPE AND STICKY GEL, 50/BAG, 20/CASE, 72/PLT 2570: Brand: RED DOT™

## (undated) DEVICE — Device: Brand: DEFENDO AIR/WATER/SUCTION AND BIOPSY VALVE

## (undated) DEVICE — FILTERLINE NASAL ADULT O2/CO2

## (undated) NOTE — IP AVS SNAPSHOT
[address]            (For Outpatient Use Only) Initial Admit Date: 2022   Inpt/Obs Admit Date: Inpt: 22 / Obs: N/A   Discharge Date:    Hospital Acct:  [de-identified]   MRN: [de-identified]   CSN: [id number]   CEID: [id number]        ENCOUNTER  Patient Class: Inpatient Admitting Provider: Neale Kussmaul, DO Unit: [address] Service: Medical Attending Provider: Patricia Hadley MD   Bed: 518-A   Visit Type:   Referring Physician: No ref. provider found Billing Flag:    Admit Diagnosis: Hypoglycemia [E16.2]      PATIENT  Legal Name:   Migdalia Arteaga   Legal Sex: Female  Gender ID:              [address] [facility] Name:    PCP:  Devon Romero: [phone]   Address:  [address] [place] [address] : 3/21/1929 (89 yrs) Mobile: [phone]         City/State/Zip: [address] [place] [address], [address] [facility] Marital:  Language: [address] [place] [address] Drive: [place] SSN4: [id number] Spiritism: Wishes Privacy     Race: White Ethnicity: Non  Or [address] [place] [address]   Name Relationship Legal Guardian? Home Phone Work Phone Mobile Phone   1.  [facility]  2. *No Contact Specified* Son      [phone]          GUARANTOR  Guarantor: Era Prime : 3/21/1929 Home Phone: [phone]   Address: [address] [place] [address]  Sex: Female Work Phone:    City/State/Zip: [address] [place] [address], [address] [place] [address] Rd   Rel. to Patient: Self Guarantor ID: [id number]   [address]   Employer:  Status: RETIRED     COVERAGE  PRIMARY INSURANCE   Payor: MEDICARE Plan: MEDICARE PART A&B   Group Number:  Insurance Type: INDEMNITY   Subscriber Name: Kelsy Mathews : 1929   Subscriber ID: [id number] Pt Rel to Subscriber: Self   SECONDARY INSURANCE   Payor:  Plan:    Group Number:  Insurance Type:    Subscriber Name:  Subscriber :    Subscriber ID:  Pt Rel to Subscriber:    TERTIARY INSURANCE   Payor:  Plan:    Group Number:  Insurance Type:    Subscriber Name:  Subscriber :    Subscriber ID:  Pt Rel to Subscriber: Hospital Account Financial Class: Medicare    December 15, 2022

## (undated) NOTE — IP AVS SNAPSHOT
[address]            (For Outpatient Use Only) Initial Admit Date: 2022   Inpt/Obs Admit Date: Inpt: 22 / Obs: 22   Discharge Date:    Hospital Acct:  [de-identified]   MRN: [de-identified]   CSN: [id number]   CEID: [id number]        ENCOUNTER  Patient Class: Inpatient Admitting Provider: Anish French DO Unit: [address] Service: Medical Attending Provider: Mortimer Cong, MD   Bed: 111-C   Visit Type:   Referring Physician: No ref. provider found Billing Flag:    Admit Diagnosis: Acute encephalopathy [G93.40]      PATIENT  Legal Name:   Lucille Lea   Legal Sex: Female  Gender ID:              [address] [facility] Name:    PCP:  Tavon Lose: [phone]   Address:  [address] [place] [address] : 3/21/1929 (89 yrs) Mobile: [phone]         City/State/Zip: [place], [address] Marital:  Language: Lonzie Ranks: [place] SSN4: [id number] Baptist: Wishes Privacy     Race: White Ethnicity: Non  Or [address] [place] [address]   Name Relationship Legal Guardian? Home Phone Work Phone Mobile Phone   1.  [facility]  2. *No Contact Specified* Son      [phone]          GUARANTOR  Guarantor: Rayne Severance : 3/21/1929 Home Phone: [phone]   Address: [address] [place] [address] S  Sex: Female Work Phone:    City/State/Zip: [place], [address]   Rel. to Patient: Self Guarantor ID: [id number]   [address]   Employer:  Status: RETIRED     COVERAGE  PRIMARY INSURANCE   Payor: MEDICARE Plan: MEDICARE PART A&B   Group Number:  Insurance Type: INDEMNITY   Subscriber Name: Clem Coombs : 1929   Subscriber ID: [id number] Pt Rel to Subscriber: Self   SECONDARY INSURANCE   Payor:  Plan:    Group Number:  Insurance Type:    Subscriber Name:  Subscriber :    Subscriber ID:  Pt Rel to Subscriber:    TERTIARY INSURANCE   Payor:  Plan:    Group Number:  Insurance Type:    Subscriber Name:  Subscriber :    Subscriber ID:  Pt Rel to Subscriber:    Hospital Account Financial Class: Medicare    August 7, 2022